# Patient Record
Sex: MALE | Race: BLACK OR AFRICAN AMERICAN | Employment: FULL TIME | URBAN - METROPOLITAN AREA
[De-identification: names, ages, dates, MRNs, and addresses within clinical notes are randomized per-mention and may not be internally consistent; named-entity substitution may affect disease eponyms.]

---

## 2017-01-05 ENCOUNTER — TELEPHONE (OUTPATIENT)
Dept: FAMILY MEDICINE CLINIC | Facility: CLINIC | Age: 52
End: 2017-01-05

## 2017-01-05 DIAGNOSIS — G89.29 CHRONIC HAND PAIN, LEFT: Primary | ICD-10-CM

## 2017-01-05 DIAGNOSIS — M79.642 CHRONIC HAND PAIN, LEFT: Primary | ICD-10-CM

## 2017-01-05 NOTE — TELEPHONE ENCOUNTER
Reviewed doctor's message with pt. Pt states he had to 6-7 days off last month. Pt is waiting for call back from [employer].     Reviewed referral information with pt, referral mailed to home address on file per pt request.

## 2017-01-05 NOTE — TELEPHONE ENCOUNTER
Pt would like to speak to RN about the [facility] paper that was received. Pt said that it stated that pt can be off for 4 days a moth due to the issues with pt hand. Pt would like to address that please.

## 2017-01-05 NOTE — TELEPHONE ENCOUNTER
Pt states he has not received a copy of [facility] paperwork that was faxed to his place of employment. Pt has a question about number of days that were approved, states only 4 days/month. Pt states he needs more time off than this, has problems when his hands swell. Reviewed 12/23/16 OV notes with pt, pt states he [facility]. Also advised pt to contact [facility] for [facility] information, pt agreed with advice given. Please advise if pt is to see ortho hand doctor.

## 2017-01-06 NOTE — TELEPHONE ENCOUNTER
Reviewed doctor's recommendations with pt. Pt states he is waiting for a call back from [facility]. Pt asked if new paperwork with this information would be faxed to his employer. Informed pt message will be forwarded to [facility] dept.

## 2017-03-07 ENCOUNTER — OFFICE VISIT (OUTPATIENT)
Dept: FAMILY MEDICINE CLINIC | Facility: CLINIC | Age: 52
End: 2017-03-07

## 2017-03-07 VITALS
DIASTOLIC BLOOD PRESSURE: 90 MMHG | HEART RATE: 70 BPM | SYSTOLIC BLOOD PRESSURE: 138 MMHG | WEIGHT: 257.19 LBS | BODY MASS INDEX: 33 KG/M2

## 2017-03-07 DIAGNOSIS — M65.9 TENOSYNOVITIS OF LEFT HAND: ICD-10-CM

## 2017-03-07 PROCEDURE — 99213 OFFICE O/P EST LOW 20 MIN: CPT | Performed by: FAMILY MEDICINE

## 2017-03-07 PROCEDURE — 99212 OFFICE O/P EST SF 10 MIN: CPT | Performed by: FAMILY MEDICINE

## 2017-03-07 NOTE — PROGRESS NOTES
HPI:    Neelima Roman is a 45year old male presents to clinic with concerns of left hand pain. This is a chronic issue for patient. He has not seen Ortho yet. States that he was pulling a box at work and pain flared up.  He has been taking Ibuprofen and Allergies:    Penicillins             Hives      ROS:   Review of Systems   Musculoskeletal:        + left hand pain         PHYSICAL EXAM:      03/07/17  0859   BP: 138/90   Pulse: 70   Weight: 257 lb 3.2 oz (116.665 kg)      Physical Exam   Constit

## 2017-05-12 ENCOUNTER — OFFICE VISIT (OUTPATIENT)
Dept: FAMILY MEDICINE CLINIC | Facility: CLINIC | Age: 52
End: 2017-05-12

## 2017-05-12 VITALS
BODY MASS INDEX: 33.37 KG/M2 | SYSTOLIC BLOOD PRESSURE: 120 MMHG | RESPIRATION RATE: 16 BRPM | HEIGHT: 74 IN | DIASTOLIC BLOOD PRESSURE: 80 MMHG | HEART RATE: 69 BPM | TEMPERATURE: 97 F | WEIGHT: 260 LBS

## 2017-05-12 DIAGNOSIS — I10 ESSENTIAL HYPERTENSION: ICD-10-CM

## 2017-05-12 DIAGNOSIS — L72.3 SEBACEOUS CYST: ICD-10-CM

## 2017-05-12 DIAGNOSIS — E11.9 TYPE 2 DIABETES MELLITUS WITHOUT COMPLICATION, WITHOUT LONG-TERM CURRENT USE OF INSULIN (HCC): Primary | ICD-10-CM

## 2017-05-12 PROCEDURE — 99212 OFFICE O/P EST SF 10 MIN: CPT | Performed by: FAMILY MEDICINE

## 2017-05-12 PROCEDURE — 99214 OFFICE O/P EST MOD 30 MIN: CPT | Performed by: FAMILY MEDICINE

## 2017-05-12 NOTE — PATIENT INSTRUCTIONS
Recommend weight loss via daily exercising and consistent healthy dietary changes. Monitor blood pressures and record at home. Limit salt intake. Medication reviewed and renewed where needed and appropriate. Comply with medications.

## 2017-05-12 NOTE — PROGRESS NOTES
HPI:    Patient ID: Robert Ruiz is a 45year old male. Hypertension  This is a chronic problem. The current episode started more than 1 year ago. The problem is controlled.  Pertinent negatives include no chest pain, headaches, palpitations, periphera MetFORMIN HCl 500 MG Oral Tab Take 1 tablet (500 mg total) by mouth 2 (two) times daily with meals. Disp: 60 tablet Rfl: 2   Atorvastatin Calcium 20 MG Oral Tab Take 1 tablet (20 mg total) by mouth nightly.  Disp: 30 tablet Rfl: 2   Enalapril Maleate (VASOT 2. Essential hypertension  To goal. Comply with medication and improve dietary intake. 3. Sebaceous cyst  Observe. If signs of infection which were discussed patient is to come back. 4.  BMI 33.0-33.9,adult  Recommend weight loss via daily exercising

## 2017-05-15 ENCOUNTER — TELEPHONE (OUTPATIENT)
Dept: FAMILY MEDICINE CLINIC | Facility: CLINIC | Age: 52
End: 2017-05-15

## 2017-05-16 NOTE — TELEPHONE ENCOUNTER
----- Message from Todd Razo DO sent at 5/13/2017  8:45 AM CDT -----  A1c shows poor control. Lipids have improved. Patient needs to get focused regarding this illness.  Starting to show increased protein in his urine and this is an indicator that

## 2017-05-16 NOTE — TELEPHONE ENCOUNTER
Spoke with patient (identified name and ), results reviewed and agrees with plan.   Called back and left contact info on patient's VM at his request, Dr Miguel Olsen or Dr Merlene Wiley, [phone] advised him to call this week and book appt, if no appts availabl

## 2017-05-24 ENCOUNTER — OFFICE VISIT (OUTPATIENT)
Dept: ENDOCRINOLOGY CLINIC | Facility: CLINIC | Age: 52
End: 2017-05-24

## 2017-05-24 VITALS
DIASTOLIC BLOOD PRESSURE: 78 MMHG | WEIGHT: 260 LBS | HEART RATE: 62 BPM | HEIGHT: 74 IN | SYSTOLIC BLOOD PRESSURE: 154 MMHG | BODY MASS INDEX: 33.37 KG/M2

## 2017-05-24 DIAGNOSIS — E11.65 TYPE 2 DIABETES MELLITUS WITH HYPERGLYCEMIA, WITHOUT LONG-TERM CURRENT USE OF INSULIN (HCC): ICD-10-CM

## 2017-05-24 DIAGNOSIS — E78.5 DYSLIPIDEMIA ASSOCIATED WITH TYPE 2 DIABETES MELLITUS (HCC): Primary | ICD-10-CM

## 2017-05-24 DIAGNOSIS — E11.69 DYSLIPIDEMIA ASSOCIATED WITH TYPE 2 DIABETES MELLITUS (HCC): Primary | ICD-10-CM

## 2017-05-24 PROCEDURE — 99212 OFFICE O/P EST SF 10 MIN: CPT | Performed by: INTERNAL MEDICINE

## 2017-05-24 PROCEDURE — 99244 OFF/OP CNSLTJ NEW/EST MOD 40: CPT | Performed by: INTERNAL MEDICINE

## 2017-05-24 RX ORDER — LANCETS 33 GAUGE
EACH MISCELLANEOUS
Qty: 100 EACH | Refills: 0 | Status: SHIPPED | OUTPATIENT
Start: 2017-05-24

## 2017-05-24 NOTE — H&P
New Patient Visit - Diabetes    CONSULT - Reason for Visit:  Diabetes management.     Requesting Physician: Buffy Appiah MD    CHIEF COMPLAINT:    DM   Dyslipidemia    HISTORY OF PRESENT ILLNESS:   Yari Recinos is a 45year old male who presents to hours as needed for Wheezing.  Disp: 2 Inhaler Rfl: 2       PAST MEDICAL HISTORY:   Past Medical History   Diagnosis Date   • Diabetes ([facility] [address]        PAST SURGICAL HISTORY:       Past Surgical History    ELECTROCARDIOGRAM, COMPLETE  10/8/2013    Comment kavita atraumatic  NECK: no adenopathy, thyroid symmetric, not enlarged and no tenderness  LUNGS: clear to auscultation bilaterally, no crackles or wheezing  CARDIOVASCULAR:  regular rate and rhythm, normal S1 and S2  ABDOMEN:  normal bowel sounds, soft, non-dist and glucometer on next visit. Patient says that he is very scared of needles and is hesitant to check. Discussed the importance of SMBG. He says he will try his best.   f). Life style changes: Diet: low carbohydrate diet discussed, Exercise: no  g).  Hypog

## 2017-05-24 NOTE — PATIENT INSTRUCTIONS
Check blood sugar 3 times per week    Start Farxiga 10 mg daily. Make sure to drink a lot of water to prevent fungal infections. Start Metformin 1/2 tablet with breakfast and 1/2 tablet with dinner for three days.  If you tolerate the medication well the

## 2017-10-23 RX ORDER — DAPAGLIFLOZIN 10 MG/1
10 TABLET, FILM COATED ORAL DAILY
Qty: 30 TABLET | Refills: 0 | OUTPATIENT
Start: 2017-10-23 | End: 2018-01-21

## 2017-10-23 NOTE — TELEPHONE ENCOUNTER
LOV 5/24/17 with RTC 1 month. No F/U scheduled. Called Luz Marina Zarate. LM that he is due for follow up. 1 month refills pending.

## 2017-11-14 ENCOUNTER — OFFICE VISIT (OUTPATIENT)
Dept: FAMILY MEDICINE CLINIC | Facility: CLINIC | Age: 52
End: 2017-11-14

## 2017-11-14 VITALS
TEMPERATURE: 97 F | BODY MASS INDEX: 32.1 KG/M2 | HEART RATE: 69 BPM | WEIGHT: 250.13 LBS | HEIGHT: 74 IN | DIASTOLIC BLOOD PRESSURE: 83 MMHG | SYSTOLIC BLOOD PRESSURE: 140 MMHG

## 2017-11-14 DIAGNOSIS — E11.9 TYPE 2 DIABETES MELLITUS WITHOUT COMPLICATION, WITHOUT LONG-TERM CURRENT USE OF INSULIN (HCC): Primary | ICD-10-CM

## 2017-11-14 DIAGNOSIS — Z13.29 THYROID DISORDER SCREEN: ICD-10-CM

## 2017-11-14 DIAGNOSIS — M79.642 LEFT HAND PAIN: ICD-10-CM

## 2017-11-14 DIAGNOSIS — I10 ESSENTIAL HYPERTENSION: ICD-10-CM

## 2017-11-14 DIAGNOSIS — Z12.5 PROSTATE CANCER SCREENING: ICD-10-CM

## 2017-11-14 PROCEDURE — 99212 OFFICE O/P EST SF 10 MIN: CPT | Performed by: FAMILY MEDICINE

## 2017-11-14 PROCEDURE — 99214 OFFICE O/P EST MOD 30 MIN: CPT | Performed by: FAMILY MEDICINE

## 2017-11-14 NOTE — PROGRESS NOTES
HPI:    Patient ID: Reinaldo Peabody is a 45year old male. 45year old AA male who continues to promise to get lab work for status update regarding his diabetes. He continues to evade getting his labs done and he does not measure his sugars at home. unhealthy diet. He never participates in exercise. An ACE inhibitor/angiotensin II receptor blocker is being taken. Review of Systems         Current Outpatient Prescriptions:  Glucose Blood (ONETOUCH VERIO) In Vitro Strip Check 1-2 times a day.  Disp Mouth/Throat: Oropharynx is clear and moist.   Neck: No thyromegaly present. Cardiovascular: Normal rate and regular rhythm. Exam reveals no gallop. Edema not present. Carotid bruit not present.   Pulmonary/Chest: Effort normal and breath sounds nor if symptoms worsen or fail to improve.          AT#[id number]

## 2017-11-14 NOTE — PATIENT INSTRUCTIONS
Pain management via prescription medications as needed. Monitor blood pressures and record at home. Limit salt intake. Recommend weight loss via daily exercising and consistent healthy dietary changes.   All adult screening ordered and done appropriate fo

## 2018-01-04 ENCOUNTER — TELEPHONE (OUTPATIENT)
Dept: FAMILY MEDICINE CLINIC | Facility: CLINIC | Age: 53
End: 2018-01-04

## 2018-01-04 NOTE — TELEPHONE ENCOUNTER
Pt dropped off FMLA at the [facility] fmla recertification please completed , pt have to have this completed no later than  January 18,2018  If any questions call pt at [phone] , pt signed HIPPA compliant release form , forms email to [email or web address] and copy placed on [place] desk at [facility]

## 2018-01-05 NOTE — TELEPHONE ENCOUNTER
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 Logged for processing.  NK

## 2018-01-10 NOTE — TELEPHONE ENCOUNTER
Dr. Dano Castro    Please sign off on form:  -Highlight the patient and hit \"Chart\" button. -In Chart Review, w/in the Encounter tab - open the Telephone call encounter for [id number]_____(DOS). Scroll down.  -Click \"scan on\" blue Hyperlink under \"Media\" heading for _PPD -FMLA _Dr. Dano Castro 7-.  -Click on Acknowledge button at the bottom right corner and left-click onto image, signature stamp appears and drag signature to Provider signature line. Stamp will turn blue. Close window.     Thank you,  Roel

## 2018-01-16 ENCOUNTER — NURSE TRIAGE (OUTPATIENT)
Dept: OTHER | Age: 53
End: 2018-01-16

## 2018-01-16 NOTE — TELEPHONE ENCOUNTER
Per pc w/ pt, form faxed to [employer], scanned, billed. Pt will  copy @[facility] and pay the bill. No further action needed.  NK

## 2018-01-16 NOTE — TELEPHONE ENCOUNTER
Action Requested: Summary for Provider     []  Critical Lab, Recommendations Needed  [] Need Additional Advice  []   FYI    []   Need Orders  [] Need Medications Sent to Pharmacy  []  Other     SUMMARY:  pls sign off.   Pt calling for a appt to see

## 2018-01-18 ENCOUNTER — OFFICE VISIT (OUTPATIENT)
Dept: FAMILY MEDICINE CLINIC | Facility: CLINIC | Age: 53
End: 2018-01-18

## 2018-01-18 VITALS
DIASTOLIC BLOOD PRESSURE: 80 MMHG | TEMPERATURE: 98 F | SYSTOLIC BLOOD PRESSURE: 140 MMHG | HEART RATE: 79 BPM | RESPIRATION RATE: 17 BRPM | HEIGHT: 74 IN

## 2018-01-18 DIAGNOSIS — R68.89 FLU-LIKE SYMPTOMS: ICD-10-CM

## 2018-01-18 DIAGNOSIS — B34.9 ACUTE VIRAL SYNDROME: Primary | ICD-10-CM

## 2018-01-18 DIAGNOSIS — G89.29 CHRONIC PAIN OF LEFT HAND: ICD-10-CM

## 2018-01-18 DIAGNOSIS — M79.642 CHRONIC PAIN OF LEFT HAND: ICD-10-CM

## 2018-01-18 PROCEDURE — 99214 OFFICE O/P EST MOD 30 MIN: CPT | Performed by: FAMILY MEDICINE

## 2018-01-18 PROCEDURE — 99212 OFFICE O/P EST SF 10 MIN: CPT | Performed by: FAMILY MEDICINE

## 2018-01-18 NOTE — PATIENT INSTRUCTIONS
Clear fluid hydration. Rest. Take all medications as prescribed. If symptoms persist or worsen please call or return to clinic ASAP. Pain management via prescription medications as needed. Comply with medications. FMLA adjusted.

## 2018-01-19 NOTE — PROGRESS NOTES
HPI:    Patient ID: Sherie Habermann is a 52year old male. Fever    This is a new problem. The current episode started in the past 7 days. The problem occurs intermittently. The problem has been gradually improving.  His temperature was unmeasured prior t 2   ibuprofen (ADVIL) 200 MG Oral Tab Take 200 mg by mouth every 6 (six) hours as needed for Pain. Disp:  Rfl:    Tadalafil (CIALIS) 20 MG Oral Tab Take 1 tablet (20 mg total) by mouth daily as needed for Erectile Dysfunction.  Disp: 8 tablet Rfl: 2   Enala pain of left hand  Pain management via prescription medications as needed. Keep specialist appointments  FMLA adjusted. No orders of the defined types were placed in this encounter.       Meds This Visit:  No prescriptions requested or ordered in this e

## 2018-03-01 ENCOUNTER — OFFICE VISIT (OUTPATIENT)
Dept: FAMILY MEDICINE CLINIC | Facility: CLINIC | Age: 53
End: 2018-03-01

## 2018-03-01 VITALS
TEMPERATURE: 98 F | HEART RATE: 86 BPM | OXYGEN SATURATION: 98 % | DIASTOLIC BLOOD PRESSURE: 70 MMHG | BODY MASS INDEX: 31 KG/M2 | WEIGHT: 242 LBS | RESPIRATION RATE: 16 BRPM | SYSTOLIC BLOOD PRESSURE: 109 MMHG

## 2018-03-01 DIAGNOSIS — R19.7 DIARRHEA, UNSPECIFIED TYPE: ICD-10-CM

## 2018-03-01 DIAGNOSIS — J18.9 PNEUMONIA OF LEFT LOWER LOBE DUE TO INFECTIOUS ORGANISM: Primary | ICD-10-CM

## 2018-03-01 PROCEDURE — 99212 OFFICE O/P EST SF 10 MIN: CPT | Performed by: FAMILY MEDICINE

## 2018-03-01 PROCEDURE — 99214 OFFICE O/P EST MOD 30 MIN: CPT | Performed by: FAMILY MEDICINE

## 2018-03-01 RX ORDER — AZITHROMYCIN 250 MG/1
TABLET, FILM COATED ORAL
Qty: 6 TABLET | Refills: 0 | Status: SHIPPED | OUTPATIENT
Start: 2018-03-01 | End: 2018-05-15 | Stop reason: ALTCHOICE

## 2018-03-01 RX ORDER — GLYBURIDE 2.5 MG/1
2.5 TABLET ORAL
Qty: 30 TABLET | Refills: 2 | Status: SHIPPED | OUTPATIENT
Start: 2018-03-01 | End: 2018-07-17

## 2018-03-01 NOTE — PROGRESS NOTES
HPI: Song Guzman is a 52year old male who presents for diarrhea and cough since Monday. Having about 6-7 episodes per day. Just watery. Denies nausea, abdominal cramping, vomiting. Has decreased appetite. Has chills. Denies body aches- only when he coughs. 86   Temp 98 °F (36.7 °C) (Oral)   Resp 16   Wt 242 lb (109.8 kg)   SpO2 98%   BMI 31.07 kg/m²    HEENT: Conjunctive clear. Preston ear canals clear. Preston TMs intact with good landmarks noted. Nares patent.   Oral mucous membrane moist.  Normal lips, teeth, a

## 2018-03-07 ENCOUNTER — TELEPHONE (OUTPATIENT)
Dept: FAMILY MEDICINE CLINIC | Facility: CLINIC | Age: 53
End: 2018-03-07

## 2018-03-07 NOTE — TELEPHONE ENCOUNTER
Pt calling to request a note to return back to work. Pt state that his job is requesting that note to state that he has been off work because he was diagnose with  pneumonia. .. please advise pt needs not by Friday to go back to work on Monday

## 2018-03-09 NOTE — TELEPHONE ENCOUNTER
Dr Rosi Potts, please see pending excuse note. Pt requested to add in his note that he had pneumonia. Pt will  the letter on Monday morning.

## 2018-03-12 ENCOUNTER — TELEPHONE (OUTPATIENT)
Dept: FAMILY MEDICINE CLINIC | Facility: CLINIC | Age: 53
End: 2018-03-12

## 2018-03-12 NOTE — TELEPHONE ENCOUNTER
Patient was seen by Dr. Casie Watts on 03/01/2018 and was Diagnosed with Pnuemonia and needs note for work stating had Pnuemonia and able to return to work today 03/12/2018. Please Fax note to [phone] attn [facility] and patient would like to pick a copy up.

## 2018-05-15 ENCOUNTER — OFFICE VISIT (OUTPATIENT)
Dept: FAMILY MEDICINE CLINIC | Facility: CLINIC | Age: 53
End: 2018-05-15

## 2018-05-15 VITALS
RESPIRATION RATE: 16 BRPM | WEIGHT: 255 LBS | SYSTOLIC BLOOD PRESSURE: 155 MMHG | DIASTOLIC BLOOD PRESSURE: 91 MMHG | TEMPERATURE: 98 F | HEART RATE: 64 BPM | BODY MASS INDEX: 32.73 KG/M2 | HEIGHT: 74 IN

## 2018-05-15 DIAGNOSIS — M79.642 LEFT HAND PAIN: Primary | ICD-10-CM

## 2018-05-15 DIAGNOSIS — IMO0001 UNCONTROLLED TYPE 2 DIABETES MELLITUS WITHOUT COMPLICATION, WITHOUT LONG-TERM CURRENT USE OF INSULIN: ICD-10-CM

## 2018-05-15 PROCEDURE — 99212 OFFICE O/P EST SF 10 MIN: CPT | Performed by: FAMILY MEDICINE

## 2018-05-15 PROCEDURE — 99214 OFFICE O/P EST MOD 30 MIN: CPT | Performed by: FAMILY MEDICINE

## 2018-05-15 RX ORDER — NAPROXEN 250 MG/1
250 TABLET ORAL 2 TIMES DAILY WITH MEALS
Qty: 60 TABLET | Refills: 1 | Status: SHIPPED | OUTPATIENT
Start: 2018-05-15 | End: 2019-03-14 | Stop reason: ALTCHOICE

## 2018-05-15 RX ORDER — GLYBURIDE 5 MG/1
5 TABLET ORAL
Qty: 30 TABLET | Refills: 2 | Status: SHIPPED | OUTPATIENT
Start: 2018-05-15 | End: 2019-02-28

## 2018-05-15 RX ORDER — GLYBURIDE 5 MG/1
5 TABLET ORAL
Qty: 30 TABLET | Refills: 2 | Status: SHIPPED | OUTPATIENT
Start: 2018-05-15 | End: 2018-05-15

## 2018-05-15 NOTE — PATIENT INSTRUCTIONS
Increase glyburide to 5 mg orally daily. Medication reviewed and renewed where needed and appropriate. Comply with medications. Recommend weight loss via daily exercising and consistent healthy dietary changes.   Monitor blood pressures and record at [facility]

## 2018-05-17 NOTE — PROGRESS NOTES
HPI:    Patient ID: Jimena Goodwin is a 52year old male. Hand Pain    The pain is present in the left hand. This is a chronic problem. The current episode started more than 1 year ago.  There has been no history of extremity trauma (But repetitive use f tablet (81 mg total) by mouth daily. Disp: 30 tablet Rfl: 11   Atorvastatin Calcium 20 MG Oral Tab Take 1 tablet (20 mg total) by mouth nightly. Disp: 30 tablet Rfl: 2   Enalapril Maleate (VASOTEC) 5 MG Oral Tab Take 1 tablet (5 mg total) by mouth daily.  D type 2 diabetes mellitus without complication, without long-term current use of insulin (HCC)  Prescription strength increased. - glyBURIDE 5 MG Oral Tab; Take 1 tablet (5 mg total) by mouth daily with breakfast.  Dispense: 30 tablet;  Refill: 2    No orde

## 2018-07-10 ENCOUNTER — NURSE TRIAGE (OUTPATIENT)
Dept: OTHER | Age: 53
End: 2018-07-10

## 2018-07-10 NOTE — TELEPHONE ENCOUNTER
Action Requested: Summary for Provider     []  Critical Lab, Recommendations Needed  [] Need Additional Advice  []   FYI    []   Need Orders  [] Need Medications Sent to Pharmacy  []  Other     SUMMARY: pt states he will go to [facility] as no appt availa

## 2018-07-17 ENCOUNTER — OFFICE VISIT (OUTPATIENT)
Dept: FAMILY MEDICINE CLINIC | Facility: CLINIC | Age: 53
End: 2018-07-17
Payer: COMMERCIAL

## 2018-07-17 ENCOUNTER — HOSPITAL ENCOUNTER (OUTPATIENT)
Dept: GENERAL RADIOLOGY | Age: 53
Discharge: HOME | End: 2018-07-17
Attending: FAMILY MEDICINE
Payer: COMMERCIAL

## 2018-07-17 VITALS
SYSTOLIC BLOOD PRESSURE: 161 MMHG | HEART RATE: 63 BPM | TEMPERATURE: 97 F | DIASTOLIC BLOOD PRESSURE: 78 MMHG | HEIGHT: 74 IN | RESPIRATION RATE: 14 BRPM

## 2018-07-17 DIAGNOSIS — M79.642 LEFT HAND PAIN: ICD-10-CM

## 2018-07-17 DIAGNOSIS — M79.642 LEFT HAND PAIN: Primary | ICD-10-CM

## 2018-07-17 DIAGNOSIS — I10 ESSENTIAL HYPERTENSION: ICD-10-CM

## 2018-07-17 PROCEDURE — 99214 OFFICE O/P EST MOD 30 MIN: CPT | Performed by: FAMILY MEDICINE

## 2018-07-17 PROCEDURE — 99212 OFFICE O/P EST SF 10 MIN: CPT | Performed by: FAMILY MEDICINE

## 2018-07-17 PROCEDURE — 73130 X-RAY EXAM OF HAND: CPT | Performed by: FAMILY MEDICINE

## 2018-07-17 NOTE — PROGRESS NOTES
HPI:    Tabatha Mccullough is a 52year old male presents to clinic for follow up. Notes that his left hand still hurts. He did re-injure it over Father's Day weekend. Has not had XR done or seen Ortho.  Notes that pain is worse in the AM, improves throughout tablet Rfl: 2   Sildenafil Citrate (VIAGRA) 100 MG Oral Tab Take  by mouth. Disp:  Rfl:    Albuterol Sulfate HFA (VENTOLIN) 108 (90 BASE) MCG/ACT Inhalation Aero Soln Inhale 2 puffs into the lungs every 6 (six) hours as needed for Wheezing.  Disp: 2 Inhaler encounter.       Meds This Visit:    No prescriptions requested or ordered in this encounter    Imaging & Referrals:  None       7/17/2018  Warden Edward MD

## 2018-07-26 ENCOUNTER — TELEPHONE (OUTPATIENT)
Dept: FAMILY MEDICINE CLINIC | Facility: CLINIC | Age: 53
End: 2018-07-26

## 2018-07-26 NOTE — TELEPHONE ENCOUNTER
----- Message from Mone Perez DO sent at 7/18/2018 11:56 AM CDT -----  No fracture seen on xray. Degenerative arthtritis noted.

## 2018-12-12 NOTE — TELEPHONE ENCOUNTER
Pt is calling to request a refill on the medications listed below. Pt states he hasn't gotten them in a while now but he would like for a prescription to be sent to pharmacy. Pt is scheduled for an appt with  1/22/19      Please advise thank you.

## 2018-12-13 RX ORDER — SILDENAFIL 100 MG/1
100 TABLET, FILM COATED ORAL
Qty: 8 TABLET | Refills: 0 | Status: SHIPPED | OUTPATIENT
Start: 2018-12-13 | End: 2020-07-30

## 2018-12-13 RX ORDER — TADALAFIL 20 MG/1
20 TABLET ORAL
Qty: 8 TABLET | Refills: 2 | Status: SHIPPED | OUTPATIENT
Start: 2018-12-13

## 2018-12-13 NOTE — TELEPHONE ENCOUNTER
Please advise in regards to refill request. Thank You    Refill Protocol Appointment Criteria  · Appointment scheduled in the past 12 months or in the next 3 months  Recent Outpatient Visits            4 months [facility]

## 2019-01-23 ENCOUNTER — TELEPHONE (OUTPATIENT)
Dept: FAMILY MEDICINE CLINIC | Facility: CLINIC | Age: 54
End: 2019-01-23

## 2019-01-23 DIAGNOSIS — E11.9 TYPE 2 DIABETES MELLITUS WITHOUT COMPLICATION, WITHOUT LONG-TERM CURRENT USE OF INSULIN (HCC): Primary | ICD-10-CM

## 2019-01-23 DIAGNOSIS — Z00.00 PHYSICAL EXAM, ROUTINE: ICD-10-CM

## 2019-01-23 DIAGNOSIS — Z13.29 THYROID DISORDER SCREEN: ICD-10-CM

## 2019-01-23 DIAGNOSIS — Z12.5 PROSTATE CANCER SCREENING: ICD-10-CM

## 2019-01-23 NOTE — TELEPHONE ENCOUNTER
Pt dropped off [facility] paperwork to be completed. Would like it faxed and a copy to .  Pt paid

## 2019-01-23 NOTE — TELEPHONE ENCOUNTER
Pt is requesting orders for his blood work , please have Dr Mandy Almeida to order them  The previous orders have   2018  Then call the pt he would like to come in before his appt in 2019 to see Dr Mandy Almeida

## 2019-01-23 NOTE — TELEPHONE ENCOUNTER
Order on chart. Please call patient. Cannot accommodate appointment request-patient missed his appointment on 01/22/19.

## 2019-01-24 NOTE — TELEPHONE ENCOUNTER
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 Logged for processing.  NK

## 2019-01-28 NOTE — TELEPHONE ENCOUNTER
Left pt a voicemail informing of orders on chart. [facility] will not accommodate appointment request. Pt missed last appointment and has cancelled or missed previous one. No further action needed at this time.

## 2019-02-01 NOTE — TELEPHONE ENCOUNTER
Dr. Harshad Cantu,       Please sign off on form:  -Highlight the patient and hit \"Chart\" button. -In Chart Review, w/in the Encounter tab - click 1 time on the Telephone call encounter [id number] _. Scroll down the telephone encounter.  -Click \"scan on\" blue Hyperlink under \"Media\" heading for _Dr. Monalisa Herbert recert 9-1-73  w/in the telephone enc.  -Click on Acknowledge button at the bottom right corner and left-click onto image, signature stamp appears and drag signature to Provider signature line. Stamp will turn blue. Close window.     Thank you,  Valeriano Prabhakar

## 2019-02-05 NOTE — TELEPHONE ENCOUNTER
Form completed and faxed to [facility]. Copies will be mailed to audrey BRAUN

## 2019-02-28 ENCOUNTER — OFFICE VISIT (OUTPATIENT)
Dept: FAMILY MEDICINE CLINIC | Facility: CLINIC | Age: 54
End: 2019-02-28
Payer: COMMERCIAL

## 2019-02-28 VITALS
TEMPERATURE: 98 F | SYSTOLIC BLOOD PRESSURE: 140 MMHG | WEIGHT: 264 LBS | BODY MASS INDEX: 33.88 KG/M2 | RESPIRATION RATE: 17 BRPM | HEIGHT: 74 IN | HEART RATE: 73 BPM | DIASTOLIC BLOOD PRESSURE: 80 MMHG

## 2019-02-28 DIAGNOSIS — E78.5 HYPERLIPIDEMIA, UNSPECIFIED HYPERLIPIDEMIA TYPE: Primary | ICD-10-CM

## 2019-02-28 DIAGNOSIS — IMO0001 UNCONTROLLED TYPE 2 DIABETES MELLITUS WITHOUT COMPLICATION, WITHOUT LONG-TERM CURRENT USE OF INSULIN: ICD-10-CM

## 2019-02-28 DIAGNOSIS — M79.642 LEFT HAND PAIN: ICD-10-CM

## 2019-02-28 DIAGNOSIS — I10 ESSENTIAL HYPERTENSION: ICD-10-CM

## 2019-02-28 PROCEDURE — 99212 OFFICE O/P EST SF 10 MIN: CPT | Performed by: FAMILY MEDICINE

## 2019-02-28 PROCEDURE — 99214 OFFICE O/P EST MOD 30 MIN: CPT | Performed by: FAMILY MEDICINE

## 2019-02-28 RX ORDER — ENALAPRIL MALEATE 5 MG/1
5 TABLET ORAL DAILY
Qty: 30 TABLET | Refills: 2 | Status: SHIPPED | OUTPATIENT
Start: 2019-02-28 | End: 2019-11-22

## 2019-02-28 RX ORDER — IBUPROFEN 800 MG/1
TABLET ORAL
Refills: 0 | COMMUNITY
Start: 2018-09-24 | End: 2020-01-13

## 2019-02-28 RX ORDER — GLYBURIDE 5 MG/1
5 TABLET ORAL
Qty: 30 TABLET | Refills: 2 | Status: SHIPPED | OUTPATIENT
Start: 2019-02-28 | End: 2019-04-24

## 2019-02-28 RX ORDER — ATORVASTATIN CALCIUM 20 MG/1
20 TABLET, FILM COATED ORAL NIGHTLY
Qty: 30 TABLET | Refills: 2 | Status: SHIPPED | OUTPATIENT
Start: 2019-02-28 | End: 2019-04-24

## 2019-02-28 NOTE — PROGRESS NOTES
HPI:    Patient ID: Jamison Amaya is a 62year old male. 66-year-old -American male with continued pain involving the left hand. This is a chronic problem. The current episode started more than 1 year ago.  There has been no history of extremity Sildenafil Citrate (VIAGRA) 100 MG Oral Tab Take 1 tablet (100 mg total) by mouth daily as needed for Erectile Dysfunction.  Disp: 8 tablet Rfl: 0   Tadalafil (CIALIS) 20 MG Oral Tab Take 1 tablet (20 mg total) by mouth daily as needed for Erectile Dysfunct - metFORMIN HCl 1000 MG Oral Tab; Take 1 tablet (1,000 mg total) by mouth 2 (two) times daily with meals. Dispense: 60 tablet; Refill: 2  - glyBURIDE 5 MG Oral Tab; Take 1 tablet (5 mg total) by mouth daily with breakfast.  Dispense: 30 tablet;  Refill: 2 Patient to return next week in a fasting state for his updated labs. Return in about 3 months (around 5/28/2019), or if symptoms worsen or fail to improve.          #[id number]

## 2019-02-28 NOTE — PATIENT INSTRUCTIONS
Medication reviewed and renewed where needed and appropriate. Comply with medications. Monitor blood pressures and record at home. Limit salt intake. Recommend weight loss via daily exercising and consistent healthy dietary changes.   Compliance has been

## 2019-03-01 LAB
ALBUMIN/GLOBULIN RATIO: 1.2 (CALC) (ref 1–2.5)
ALBUMIN: 3.8 G/DL (ref 3.6–5.1)
ALKALINE PHOSPHATASE: 90 U/L (ref 40–115)
ALT: 16 U/L (ref 9–46)
AST: 12 U/L (ref 10–35)
BILIRUBIN, TOTAL: 0.3 MG/DL (ref 0.2–1.2)
BUN: 12 MG/DL (ref 7–25)
CALCIUM: 9.3 MG/DL (ref 8.6–10.3)
CARBON DIOXIDE: 30 MMOL/L (ref 20–32)
CHLORIDE: 99 MMOL/L (ref 98–110)
CHOL/HDLC RATIO: 4.9 (CALC)
CHOLESTEROL, TOTAL: 236 MG/DL
CREATININE: 0.93 MG/DL (ref 0.7–1.33)
EGFR IF AFRICN AM: 106 ML/MIN/1.73M2
EGFR IF NONAFRICN AM: 91 ML/MIN/1.73M2
GLOBULIN: 3.1 G/DL (CALC) (ref 1.9–3.7)
GLUCOSE: 281 MG/DL (ref 65–99)
HDL CHOLESTEROL: 48 MG/DL
HEMOGLOBIN A1C: 11.9 % OF TOTAL HGB
LDL-CHOLESTEROL: 157 MG/DL (CALC)
NON-HDL CHOLESTEROL: 188 MG/DL (CALC)
POTASSIUM: 4.5 MMOL/L (ref 3.5–5.3)
PROTEIN, TOTAL, RANDOM UR: 55 MG/DL (ref 5–25)
PROTEIN, TOTAL: 6.9 G/DL (ref 6.1–8.1)
SODIUM: 138 MMOL/L (ref 135–146)
TRIGLYCERIDES: 174 MG/DL

## 2019-03-06 ENCOUNTER — TELEPHONE (OUTPATIENT)
Dept: FAMILY MEDICINE CLINIC | Facility: CLINIC | Age: 54
End: 2019-03-06

## 2019-03-06 NOTE — TELEPHONE ENCOUNTER
----- Message from Everardo Ledesma DO sent at 3/4/2019  1:40 PM CST -----  Patient's blood sugars poorly controlled. Staff the patient to get serious about his condition and the potential damage he can do to his organs.   I am recommending a referral t

## 2019-03-11 NOTE — TELEPHONE ENCOUNTER
Advised patient of Dr. Parrish Severe note.  Patient verbalized understanding  Called patient back and left voicemail with Dr. Ebony Bynum information per request.

## 2019-03-14 ENCOUNTER — OFFICE VISIT (OUTPATIENT)
Dept: FAMILY MEDICINE CLINIC | Facility: CLINIC | Age: 54
End: 2019-03-14
Payer: COMMERCIAL

## 2019-03-14 VITALS
WEIGHT: 254.38 LBS | DIASTOLIC BLOOD PRESSURE: 83 MMHG | HEART RATE: 99 BPM | SYSTOLIC BLOOD PRESSURE: 156 MMHG | BODY MASS INDEX: 33 KG/M2 | TEMPERATURE: 98 F | RESPIRATION RATE: 18 BRPM | OXYGEN SATURATION: 93 %

## 2019-03-14 DIAGNOSIS — R05.9 COUGH: Primary | ICD-10-CM

## 2019-03-14 PROCEDURE — 99213 OFFICE O/P EST LOW 20 MIN: CPT | Performed by: FAMILY MEDICINE

## 2019-03-14 PROCEDURE — 99212 OFFICE O/P EST SF 10 MIN: CPT | Performed by: FAMILY MEDICINE

## 2019-03-14 RX ORDER — AZITHROMYCIN 250 MG/1
TABLET, FILM COATED ORAL
Qty: 6 TABLET | Refills: 0 | Status: SHIPPED | OUTPATIENT
Start: 2019-03-14 | End: 2019-05-03 | Stop reason: ALTCHOICE

## 2019-03-14 NOTE — PROGRESS NOTES
HPI:    Patient ID: Torres Nicholson is a 62year old male. Cough   This is a new problem. The current episode started 1 to 4 weeks ago. The problem has been waxing and waning. The cough is productive of sputum.  Pertinent negatives include no chest pain, (VENTOLIN) 108 (90 BASE) MCG/ACT Inhalation Aero Soln Inhale 2 puffs into the lungs every 6 (six) hours as needed for Wheezing.  Disp: 2 Inhaler Rfl: 2     Allergies:  Penicillins             HIVES   PHYSICAL EXAM:   Physical Exam   Constitutional: He is or

## 2019-04-24 ENCOUNTER — OFFICE VISIT (OUTPATIENT)
Dept: ENDOCRINOLOGY CLINIC | Facility: CLINIC | Age: 54
End: 2019-04-24
Payer: COMMERCIAL

## 2019-04-24 VITALS
SYSTOLIC BLOOD PRESSURE: 178 MMHG | BODY MASS INDEX: 34.31 KG/M2 | HEIGHT: 74 IN | WEIGHT: 267.38 LBS | DIASTOLIC BLOOD PRESSURE: 87 MMHG | HEART RATE: 64 BPM

## 2019-04-24 DIAGNOSIS — E11.65 TYPE 2 DIABETES MELLITUS WITH HYPERGLYCEMIA, WITHOUT LONG-TERM CURRENT USE OF INSULIN (HCC): Primary | ICD-10-CM

## 2019-04-24 DIAGNOSIS — E78.5 HYPERLIPIDEMIA, UNSPECIFIED HYPERLIPIDEMIA TYPE: ICD-10-CM

## 2019-04-24 PROCEDURE — 82962 GLUCOSE BLOOD TEST: CPT | Performed by: INTERNAL MEDICINE

## 2019-04-24 PROCEDURE — 99212 OFFICE O/P EST SF 10 MIN: CPT | Performed by: INTERNAL MEDICINE

## 2019-04-24 PROCEDURE — 36416 COLLJ CAPILLARY BLOOD SPEC: CPT | Performed by: INTERNAL MEDICINE

## 2019-04-24 PROCEDURE — 99244 OFF/OP CNSLTJ NEW/EST MOD 40: CPT | Performed by: INTERNAL MEDICINE

## 2019-04-24 RX ORDER — ATORVASTATIN CALCIUM 20 MG/1
20 TABLET, FILM COATED ORAL NIGHTLY
Qty: 90 TABLET | Refills: 0 | Status: SHIPPED | OUTPATIENT
Start: 2019-04-24 | End: 2019-11-22

## 2019-04-24 RX ORDER — FLASH GLUCOSE SENSOR
1 KIT MISCELLANEOUS
Qty: 6 EACH | Refills: 0 | Status: SHIPPED | OUTPATIENT
Start: 2019-04-24

## 2019-04-24 RX ORDER — GLIPIZIDE 10 MG/1
10 TABLET ORAL
Qty: 180 TABLET | Refills: 0 | Status: SHIPPED | OUTPATIENT
Start: 2019-04-24 | End: 2019-11-22

## 2019-04-24 RX ORDER — HYDROCHLOROTHIAZIDE 25 MG/1
25 TABLET ORAL DAILY
Qty: 90 TABLET | Refills: 0 | Status: SHIPPED | OUTPATIENT
Start: 2019-04-24 | End: 2019-11-22

## 2019-04-24 RX ORDER — FLASH GLUCOSE SCANNING READER
1 EACH MISCELLANEOUS ONCE
Qty: 1 DEVICE | Refills: 0 | Status: SHIPPED | OUTPATIENT
Start: 2019-04-24 | End: 2019-04-24

## 2019-04-24 NOTE — H&P
New Patient Visit - Diabetes    CONSULT - Reason for Visit:  Diabetes management.     Requesting Physician: Dr. Apolinar Adams:  Patient presents with:  Consult: DM consult       HISTORY OF PRESENT ILLNESS:   Aleksandr Colón is a 62year old mal Tab TAKE 1 TABLET BY MOUTH EVERY 8 HOURS AS NEEDED FOR PAIN Disp:  Rfl: 0   metFORMIN HCl 1000 MG Oral Tab Take 1 tablet (1,000 mg total) by mouth 2 (two) times daily with meals.  Disp: 60 tablet Rfl: 2   Sildenafil Citrate (VIAGRA) 100 MG Oral Tab Take 1 t use: No    Other Topics      Concerns:        Caffeine Concern: No      FAMILY HISTORY:   Family History   Problem Relation Age of Onset   • Diabetes Mother        ASSESSMENTS:        REVIEW OF SYSTEMS:  Constitutional: Negative for: weight change, fever, diabetes. Patient understands the importance of glycemic control and the implications of uncontrolled diabetes including Diabetic ketoacidosis and various micro vascular and macrovascular complications. a).  Medications:  Metformin: Increase to 1000 mg

## 2019-04-24 NOTE — PATIENT INSTRUCTIONS
Metformin 1000 with breakfast and 1000 mg with dinner  Glyburide: STOP.  I am changing this to Glipizide 10 mg daily: take one pill with breakfast    I am prescribing the freestyle carla    Check sugars before breakfast and before dinner  Call in one week w

## 2019-05-03 ENCOUNTER — OFFICE VISIT (OUTPATIENT)
Dept: FAMILY MEDICINE CLINIC | Facility: CLINIC | Age: 54
End: 2019-05-03
Payer: COMMERCIAL

## 2019-05-03 VITALS
SYSTOLIC BLOOD PRESSURE: 130 MMHG | BODY MASS INDEX: 34 KG/M2 | WEIGHT: 264.19 LBS | DIASTOLIC BLOOD PRESSURE: 80 MMHG | RESPIRATION RATE: 18 BRPM | HEART RATE: 74 BPM | TEMPERATURE: 98 F

## 2019-05-03 DIAGNOSIS — E11.9 TYPE 2 DIABETES MELLITUS WITHOUT COMPLICATION, WITHOUT LONG-TERM CURRENT USE OF INSULIN (HCC): Primary | ICD-10-CM

## 2019-05-03 DIAGNOSIS — E78.5 HYPERLIPIDEMIA, UNSPECIFIED HYPERLIPIDEMIA TYPE: ICD-10-CM

## 2019-05-03 DIAGNOSIS — M79.642 LEFT HAND PAIN: ICD-10-CM

## 2019-05-03 DIAGNOSIS — I10 ESSENTIAL HYPERTENSION: ICD-10-CM

## 2019-05-03 PROCEDURE — 99214 OFFICE O/P EST MOD 30 MIN: CPT | Performed by: FAMILY MEDICINE

## 2019-05-03 PROCEDURE — 99212 OFFICE O/P EST SF 10 MIN: CPT | Performed by: FAMILY MEDICINE

## 2019-05-03 NOTE — PATIENT INSTRUCTIONS
I discussed with patient the possibility of switching 1 of his blood sugar medications to Januvia or Jardiance. We will discuss with endocrinologist for clearance. We will also try to see what cholesterol-lowering agent is covered under his insurance.   D

## 2019-05-03 NOTE — PROGRESS NOTES
HPI:    Patient ID: Celia Escobedo is a 62year old male. 57-year-old -American male with continued pain involving the left hand. This is a chronic problem. The current episode started more than 1 year ago.  There has been no history of extremity Albuterol Sulfate HFA (VENTOLIN) 108 (90 BASE) MCG/ACT Inhalation Aero Soln Inhale 2 puffs into the lungs every 6 (six) hours as needed for Wheezing.  Disp: 2 Inhaler Rfl: 2   Continuous Blood Gluc Sensor ([employer] JULIETTE 14 DAY SENSOR) Does not apply Misc None  Patient Instructions   I discussed with patient the possibility of switching 1 of his blood sugar medications to Januvia or Jardiance. We will discuss with endocrinologist for clearance.   We will also try to see what cholesterol-lowering agent is co

## 2019-06-18 ENCOUNTER — NURSE TRIAGE (OUTPATIENT)
Dept: OTHER | Age: 54
End: 2019-06-18

## 2019-06-18 ENCOUNTER — OFFICE VISIT (OUTPATIENT)
Dept: FAMILY MEDICINE CLINIC | Facility: CLINIC | Age: 54
End: 2019-06-18
Payer: COMMERCIAL

## 2019-06-18 VITALS
SYSTOLIC BLOOD PRESSURE: 108 MMHG | DIASTOLIC BLOOD PRESSURE: 66 MMHG | HEART RATE: 87 BPM | BODY MASS INDEX: 32.85 KG/M2 | TEMPERATURE: 99 F | HEIGHT: 74 IN | WEIGHT: 256 LBS

## 2019-06-18 DIAGNOSIS — A08.4 VIRAL GASTROENTERITIS: Primary | ICD-10-CM

## 2019-06-18 DIAGNOSIS — E11.9 TYPE 2 DIABETES MELLITUS WITHOUT COMPLICATION, WITHOUT LONG-TERM CURRENT USE OF INSULIN (HCC): ICD-10-CM

## 2019-06-18 PROCEDURE — 82962 GLUCOSE BLOOD TEST: CPT | Performed by: FAMILY MEDICINE

## 2019-06-18 PROCEDURE — 36416 COLLJ CAPILLARY BLOOD SPEC: CPT | Performed by: FAMILY MEDICINE

## 2019-06-18 PROCEDURE — 99212 OFFICE O/P EST SF 10 MIN: CPT | Performed by: FAMILY MEDICINE

## 2019-06-18 PROCEDURE — 99214 OFFICE O/P EST MOD 30 MIN: CPT | Performed by: FAMILY MEDICINE

## 2019-06-18 NOTE — TELEPHONE ENCOUNTER
The patient can be seen in [facility], however I do not have any availability so you can check with another provider here.

## 2019-06-18 NOTE — TELEPHONE ENCOUNTER
Action Requested: Summary for Provider     []  Critical Lab, Recommendations Needed  [] Need Additional Advice  []   FYI    []   Need Orders  [] Need Medications Sent to Pharmacy  []  Other     SUMMARY:    Dr. Jinny Guzman please advise.   He only wants to be s

## 2019-06-18 NOTE — PROGRESS NOTES
HPI:    Elpidio Roberts is a 62year old male presents to clinic with a 4-day history of loose stools. Saturday night, patient drinks some alcohol and ate ribs that were prepared at a restaurant. Later that night, he woke up with loose stools.   Since th daily. Disp: 30 tablet Rfl: 2   Sildenafil Citrate (VIAGRA) 100 MG Oral Tab Take 1 tablet (100 mg total) by mouth daily as needed for Erectile Dysfunction.  Disp: 8 tablet Rfl: 0   Tadalafil (CIALIS) 20 MG Oral Tab Take 1 tablet (20 mg total) by mouth daily alert.   Vitals reviewed.       ASSESSMENT/PLAN:   (A08.4) Viral gastroenteritis  (primary encounter diagnosis)  Plan:   - advised adequate hydration with water and a sports drink with electrolytes  - bland diet advised - toast, applesauce, soup, rice, bana

## 2019-06-18 NOTE — TELEPHONE ENCOUNTER
Spoke with patient and advised that PCP is fully booked today and cannot be seen but advised to be seen by other provider and agrees, OV made with Dr Sethi Getting for today 6/18/19.

## 2019-06-20 ENCOUNTER — TELEPHONE (OUTPATIENT)
Dept: FAMILY MEDICINE CLINIC | Facility: CLINIC | Age: 54
End: 2019-06-20

## 2019-06-20 NOTE — TELEPHONE ENCOUNTER
Pt is requesting a return to work note for the following days : 06/10-06/24 since he doesn't work Friday's. Requesting to  note.

## 2019-06-22 NOTE — TELEPHONE ENCOUNTER
Left pt a vm that letter is on chart as he has Mychart. Also informed can picked up printed letter from [facility] office.

## 2019-06-24 NOTE — TELEPHONE ENCOUNTER
Called pt left msg needs to call back. Need clarification on dates since we are getting two different thing first msg stated he need from 6/10 to return 6/24 now he wants 6/17 to rt 6/24 please clarify. Thank you.

## 2019-06-24 NOTE — TELEPHONE ENCOUNTER
Pt called back to give the fax # to have letter fax so he can go back to work today.   Pt also said the letter he picked up from office today 5 minutes ago the date is wrong    Pt said the date on the letter should be from   6/17 and he can return back to w

## 2019-11-22 ENCOUNTER — OFFICE VISIT (OUTPATIENT)
Dept: FAMILY MEDICINE CLINIC | Facility: CLINIC | Age: 54
End: 2019-11-22
Payer: COMMERCIAL

## 2019-11-22 VITALS
HEIGHT: 74 IN | RESPIRATION RATE: 17 BRPM | BODY MASS INDEX: 33.11 KG/M2 | HEART RATE: 54 BPM | DIASTOLIC BLOOD PRESSURE: 84 MMHG | SYSTOLIC BLOOD PRESSURE: 126 MMHG | WEIGHT: 258 LBS | TEMPERATURE: 98 F

## 2019-11-22 DIAGNOSIS — Z13.29 THYROID DISORDER SCREEN: ICD-10-CM

## 2019-11-22 DIAGNOSIS — M79.642 CHRONIC PAIN OF LEFT HAND: Primary | ICD-10-CM

## 2019-11-22 DIAGNOSIS — E11.9 TYPE 2 DIABETES MELLITUS WITHOUT COMPLICATION, WITHOUT LONG-TERM CURRENT USE OF INSULIN (HCC): ICD-10-CM

## 2019-11-22 DIAGNOSIS — G89.29 CHRONIC PAIN OF LEFT HAND: Primary | ICD-10-CM

## 2019-11-22 DIAGNOSIS — E78.5 HYPERLIPIDEMIA, UNSPECIFIED HYPERLIPIDEMIA TYPE: ICD-10-CM

## 2019-11-22 DIAGNOSIS — I10 ESSENTIAL HYPERTENSION: ICD-10-CM

## 2019-11-22 DIAGNOSIS — E78.5 DYSLIPIDEMIA: ICD-10-CM

## 2019-11-22 DIAGNOSIS — Z12.5 PROSTATE CANCER SCREENING: ICD-10-CM

## 2019-11-22 PROCEDURE — 99214 OFFICE O/P EST MOD 30 MIN: CPT | Performed by: FAMILY MEDICINE

## 2019-11-22 RX ORDER — GLIPIZIDE 10 MG/1
10 TABLET ORAL
Qty: 60 TABLET | Refills: 2 | Status: SHIPPED | OUTPATIENT
Start: 2019-11-22 | End: 2020-02-27

## 2019-11-22 RX ORDER — METFORMIN HYDROCHLORIDE 1000 MG/1
1000 TABLET, FILM COATED, EXTENDED RELEASE ORAL
Qty: 30 TABLET | Refills: 2 | Status: SHIPPED | OUTPATIENT
Start: 2019-11-22 | End: 2020-02-27

## 2019-11-22 RX ORDER — ENALAPRIL MALEATE 5 MG/1
5 TABLET ORAL DAILY
Qty: 30 TABLET | Refills: 2 | Status: SHIPPED | OUTPATIENT
Start: 2019-11-22 | End: 2020-02-27

## 2019-11-22 RX ORDER — HYDROCHLOROTHIAZIDE 25 MG/1
25 TABLET ORAL DAILY
Qty: 30 TABLET | Refills: 2 | Status: SHIPPED | OUTPATIENT
Start: 2019-11-22 | End: 2020-02-27

## 2019-11-22 RX ORDER — ATORVASTATIN CALCIUM 20 MG/1
20 TABLET, FILM COATED ORAL NIGHTLY
Qty: 30 TABLET | Refills: 2 | Status: SHIPPED | OUTPATIENT
Start: 2019-11-22 | End: 2021-02-25

## 2019-11-25 NOTE — PROGRESS NOTES
HPI:    Patient ID: Magdalena Childers is a 62year old male.     Patient is a 47-year-old -American male well established at this clinic treated for hypertension and diabetes who is unfortunately noncompliant regarding measuring his blood sugars (has a (CIALIS) 20 MG Oral Tab Take 1 tablet (20 mg total) by mouth daily as needed for Erectile Dysfunction. 8 tablet 2   • Glucose Blood (ONETOUCH VERIO) In Vitro Strip Check 1-2 times a day.  50 each 0   • ONETOUCH DELICA LANCETS 56L Does not apply Misc Check o metFORMIN HCl ER, MOD, 1000 MG (MOD) Oral Tablet 24 Hr; Take 1 tablet (1,000 mg total) by mouth daily with breakfast.  Dispense: 30 tablet; Refill: 2    3. Dyslipidemia  Status update    4.  Essential hypertension  When measured manually by physician blood and renewed where needed and appropriate. Monitor blood pressures and record at home. Limit salt intake. Recommend weight loss via daily exercising and consistent healthy dietary changes. Encouraged physical fitness and daily physical activity daily.   P

## 2020-01-13 NOTE — TELEPHONE ENCOUNTER
Patient requesting refill, pharmacy advised him to call us.  Its been a year since he has had this medication    ibuprofen 800 MG Oral Tab

## 2020-01-14 RX ORDER — IBUPROFEN 800 MG/1
TABLET ORAL
Qty: 90 TABLET | Refills: 0 | Status: SHIPPED | OUTPATIENT
Start: 2020-01-14 | End: 2020-02-07

## 2020-01-14 NOTE — TELEPHONE ENCOUNTER
Rx listed as historical, pls advise, thanks in advance.        Refill Protocol Appointment Criteria  · Appointment scheduled in the past 12 months or in the next 3 months  Recent Outpatient Visits            1 month ago Chronic pain of left hand    [place]

## 2020-02-07 RX ORDER — IBUPROFEN 800 MG/1
TABLET ORAL
Qty: 90 TABLET | Refills: 0 | Status: SHIPPED | OUTPATIENT
Start: 2020-02-07 | End: 2020-12-23

## 2020-02-07 NOTE — TELEPHONE ENCOUNTER
Review pended refill request as it does not fall under a protocol.     Last Rx: 1/14/20  LOV: 2 months ago

## 2020-02-21 ENCOUNTER — LAB ENCOUNTER (OUTPATIENT)
Dept: LAB | Age: 55
End: 2020-02-21
Attending: FAMILY MEDICINE
Payer: COMMERCIAL

## 2020-02-21 DIAGNOSIS — Z13.29 SCREENING FOR THYROID DISORDER: ICD-10-CM

## 2020-02-21 DIAGNOSIS — I10 ESSENTIAL HYPERTENSION, MALIGNANT: ICD-10-CM

## 2020-02-21 DIAGNOSIS — E11.9 DIABETES MELLITUS (HCC): Primary | ICD-10-CM

## 2020-02-21 PROCEDURE — 36415 COLL VENOUS BLD VENIPUNCTURE: CPT

## 2020-02-22 LAB
ABSOLUTE BASOPHILS: 50 CELLS/UL (ref 0–200)
ABSOLUTE EOSINOPHILS: 256 CELLS/UL (ref 15–500)
ABSOLUTE LYMPHOCYTES: 2691 CELLS/UL (ref 850–3900)
ABSOLUTE MONOCYTES: 774 CELLS/UL (ref 200–950)
ABSOLUTE NEUTROPHILS: 3330 CELLS/UL (ref 1500–7800)
ALBUMIN/GLOBULIN RATIO: 1.5 (CALC) (ref 1–2.5)
ALBUMIN: 4.1 G/DL (ref 3.6–5.1)
ALKALINE PHOSPHATASE: 68 U/L (ref 35–144)
ALT: 14 U/L (ref 9–46)
AST: 14 U/L (ref 10–35)
BASOPHILS: 0.7 %
BILIRUBIN, TOTAL: 0.6 MG/DL (ref 0.2–1.2)
BUN: 14 MG/DL (ref 7–25)
CALCIUM: 9.6 MG/DL (ref 8.6–10.3)
CARBON DIOXIDE: 29 MMOL/L (ref 20–32)
CHLORIDE: 106 MMOL/L (ref 98–110)
CHOL/HDLC RATIO: 4.9 (CALC)
CHOLESTEROL, TOTAL: 257 MG/DL
CREATININE: 1.13 MG/DL (ref 0.7–1.33)
EGFR IF AFRICN AM: 83 ML/MIN/1.73M2
EGFR IF NONAFRICN AM: 72 ML/MIN/1.73M2
EOSINOPHILS: 3.6 %
GLOBULIN: 2.8 G/DL (CALC) (ref 1.9–3.7)
GLUCOSE: 149 MG/DL (ref 65–99)
HDL CHOLESTEROL: 52 MG/DL
HEMATOCRIT: 40.2 % (ref 38.5–50)
HEMOGLOBIN A1C: 8.7 % OF TOTAL HGB
HEMOGLOBIN: 14.1 G/DL (ref 13.2–17.1)
LDL-CHOLESTEROL: 176 MG/DL (CALC)
LYMPHOCYTES: 37.9 %
MCH: 31.1 PG (ref 27–33)
MCHC: 35.1 G/DL (ref 32–36)
MCV: 88.7 FL (ref 80–100)
MONOCYTES: 10.9 %
MPV: 10.6 FL (ref 7.5–12.5)
NEUTROPHILS: 46.9 %
NON-HDL CHOLESTEROL: 205 MG/DL (CALC)
PLATELET COUNT: 278 THOUSAND/UL (ref 140–400)
POTASSIUM: 4 MMOL/L (ref 3.5–5.3)
PROTEIN, TOTAL, RANDOM UR: 61 MG/DL (ref 5–25)
PROTEIN, TOTAL: 6.9 G/DL (ref 6.1–8.1)
RDW: 12.8 % (ref 11–15)
RED BLOOD CELL COUNT: 4.53 MILLION/UL (ref 4.2–5.8)
SODIUM: 141 MMOL/L (ref 135–146)
TRIGLYCERIDES: 146 MG/DL
TSH: 1.41 MIU/L (ref 0.4–4.5)
WHITE BLOOD CELL COUNT: 7.1 THOUSAND/UL (ref 3.8–10.8)

## 2020-02-27 ENCOUNTER — OFFICE VISIT (OUTPATIENT)
Dept: FAMILY MEDICINE CLINIC | Facility: CLINIC | Age: 55
End: 2020-02-27
Payer: COMMERCIAL

## 2020-02-27 VITALS
RESPIRATION RATE: 17 BRPM | HEART RATE: 76 BPM | HEIGHT: 74 IN | BODY MASS INDEX: 33 KG/M2 | SYSTOLIC BLOOD PRESSURE: 151 MMHG | DIASTOLIC BLOOD PRESSURE: 76 MMHG

## 2020-02-27 DIAGNOSIS — E11.9 TYPE 2 DIABETES MELLITUS WITHOUT COMPLICATION, WITHOUT LONG-TERM CURRENT USE OF INSULIN (HCC): ICD-10-CM

## 2020-02-27 DIAGNOSIS — E78.5 HYPERLIPIDEMIA, UNSPECIFIED HYPERLIPIDEMIA TYPE: ICD-10-CM

## 2020-02-27 DIAGNOSIS — I10 ESSENTIAL HYPERTENSION: ICD-10-CM

## 2020-02-27 DIAGNOSIS — M79.642 LEFT HAND PAIN: ICD-10-CM

## 2020-02-27 DIAGNOSIS — R82.90 ABNORMAL URINE ODOR: Primary | ICD-10-CM

## 2020-02-27 PROCEDURE — 99214 OFFICE O/P EST MOD 30 MIN: CPT | Performed by: FAMILY MEDICINE

## 2020-02-27 RX ORDER — ENALAPRIL MALEATE 5 MG/1
5 TABLET ORAL DAILY
Qty: 30 TABLET | Refills: 2 | Status: SHIPPED | OUTPATIENT
Start: 2020-02-27 | End: 2020-06-03

## 2020-02-27 RX ORDER — SIMVASTATIN 10 MG
10 TABLET ORAL DAILY
Qty: 90 TABLET | Refills: 3 | Status: SHIPPED | OUTPATIENT
Start: 2020-02-27 | End: 2021-02-21

## 2020-02-27 RX ORDER — METFORMIN HYDROCHLORIDE 1000 MG/1
1000 TABLET, FILM COATED, EXTENDED RELEASE ORAL
Qty: 30 TABLET | Refills: 2 | Status: SHIPPED | OUTPATIENT
Start: 2020-02-27 | End: 2021-02-25

## 2020-02-27 RX ORDER — GLIPIZIDE 10 MG/1
10 TABLET ORAL
Qty: 60 TABLET | Refills: 2 | Status: SHIPPED | OUTPATIENT
Start: 2020-02-27 | End: 2020-05-26

## 2020-02-27 RX ORDER — HYDROCHLOROTHIAZIDE 25 MG/1
25 TABLET ORAL DAILY
Qty: 30 TABLET | Refills: 2 | Status: SHIPPED | OUTPATIENT
Start: 2020-02-27 | End: 2020-06-03

## 2020-02-27 NOTE — PATIENT INSTRUCTIONS
Patient has been encouraged to comply with all medications regarding diabetes (glipizide and metformin).   Patient has been educated as to why enalapril is important to take on a daily basis regardless of blood pressure status (this is an arterial vessel [place]

## 2020-02-28 NOTE — PROGRESS NOTES
HPI:    Patient ID: Peng Nam is a 60year old male. This patient is a 49-year-old -American male well-known to our clinic who was treated for hypertension and diabetes but admittedly not compliant with all his medications.   Patient is here 2   • Glucose Blood (ONETOUCH VERIO) In Vitro Strip Check 1-2 times a day.  50 each 0   • ONETOUCH DELICA LANCETS 64W Does not apply Misc Check once a day 100 each 0   • ibuprofen (ADVIL) 200 MG Oral Tab Take 200 mg by mouth every 6 (six) hours as needed fo long-term current use of insulin ([facility]  Occasion reviewed and renewed. - metFORMIN HCl ER, MOD, 1000 MG (MOD) Oral Tablet 24 Hr; Take 1 tablet (1,000 mg total) by mouth daily with breakfast.  Dispense: 30 tablet; Refill: 2  - glipiZIDE 10 MG Oral Tab;  Qiana Rase

## 2020-02-29 LAB
BILIRUBIN: NEGATIVE
COLOR: YELLOW
KETONES: NEGATIVE
NITRITE: NEGATIVE
OCCULT BLOOD: NEGATIVE
PH: 7 (ref 5–8)
SPECIFIC GRAVITY: 1.02 (ref 1–1.03)

## 2020-03-01 DIAGNOSIS — B96.20 E. COLI UTI: Primary | ICD-10-CM

## 2020-03-01 DIAGNOSIS — N39.0 E. COLI UTI: Primary | ICD-10-CM

## 2020-03-01 RX ORDER — CIPROFLOXACIN 250 MG/1
250 TABLET, FILM COATED ORAL 2 TIMES DAILY
Qty: 14 TABLET | Refills: 0 | Status: SHIPPED | OUTPATIENT
Start: 2020-03-01 | End: 2020-03-08

## 2020-03-14 ENCOUNTER — TELEPHONE (OUTPATIENT)
Dept: FAMILY MEDICINE CLINIC | Facility: CLINIC | Age: 55
End: 2020-03-14

## 2020-03-18 NOTE — TELEPHONE ENCOUNTER
Dr. Parrish Severe,    Intermittent leave recert for Left hand pain  Please sign off on form:  -Highlight the patient and hit \"Chart\" button. -In Chart Review, w/in the Encounter tab - click 1 time on the Telephone call encounter for 3/14/20.  Scroll down the

## 2020-03-20 NOTE — TELEPHONE ENCOUNTER
LEELA completed and faxed to Adrienne Reese [phone].  Sent pt [employer] message

## 2020-05-26 DIAGNOSIS — E11.9 TYPE 2 DIABETES MELLITUS WITHOUT COMPLICATION, WITHOUT LONG-TERM CURRENT USE OF INSULIN (HCC): ICD-10-CM

## 2020-05-26 RX ORDER — GLIPIZIDE 10 MG/1
10 TABLET ORAL
Qty: 180 TABLET | Refills: 0 | Status: SHIPPED | OUTPATIENT
Start: 2020-05-26 | End: 2020-09-11

## 2020-06-03 DIAGNOSIS — I10 ESSENTIAL HYPERTENSION: ICD-10-CM

## 2020-06-03 RX ORDER — ENALAPRIL MALEATE 5 MG/1
TABLET ORAL
Qty: 90 TABLET | Refills: 0 | Status: SHIPPED | OUTPATIENT
Start: 2020-06-03 | End: 2020-09-11

## 2020-06-03 RX ORDER — HYDROCHLOROTHIAZIDE 25 MG/1
TABLET ORAL
Qty: 90 TABLET | Refills: 0 | Status: SHIPPED | OUTPATIENT
Start: 2020-06-03 | End: 2020-09-11

## 2020-07-15 ENCOUNTER — OFFICE VISIT (OUTPATIENT)
Dept: FAMILY MEDICINE CLINIC | Facility: CLINIC | Age: 55
End: 2020-07-15
Payer: COMMERCIAL

## 2020-07-15 VITALS
WEIGHT: 272 LBS | SYSTOLIC BLOOD PRESSURE: 134 MMHG | BODY MASS INDEX: 34.91 KG/M2 | HEART RATE: 78 BPM | HEIGHT: 74 IN | DIASTOLIC BLOOD PRESSURE: 78 MMHG | TEMPERATURE: 97 F

## 2020-07-15 DIAGNOSIS — M79.642 LEFT HAND PAIN: Primary | ICD-10-CM

## 2020-07-15 DIAGNOSIS — E11.9 TYPE 2 DIABETES MELLITUS WITHOUT COMPLICATION, WITHOUT LONG-TERM CURRENT USE OF INSULIN (HCC): ICD-10-CM

## 2020-07-15 DIAGNOSIS — E78.5 DYSLIPIDEMIA: ICD-10-CM

## 2020-07-15 PROCEDURE — 99214 OFFICE O/P EST MOD 30 MIN: CPT | Performed by: FAMILY MEDICINE

## 2020-07-15 NOTE — PROGRESS NOTES
HPI:    Patient ID: All Myers is a 60year old male. This 13-year-old -American male presents again with chronic left hand pain which is secondary to repetitive use and not blamed on any isolated incident.   Resting the hand and pain medicati (VENTOLIN) 108 (90 BASE) MCG/ACT Inhalation Aero Soln Inhale 2 puffs into the lungs every 6 (six) hours as needed for Wheezing.  (Patient not taking: Reported on 7/15/2020 ) 2 Inhaler 2     Allergies:  Penicillins             HIVES     07/15/20  2455   BP:

## 2020-07-30 RX ORDER — SILDENAFIL 100 MG/1
100 TABLET, FILM COATED ORAL
Qty: 8 TABLET | Refills: 0 | Status: SHIPPED | OUTPATIENT
Start: 2020-07-30 | End: 2020-09-15

## 2020-07-30 RX ORDER — SILDENAFIL 100 MG/1
TABLET, FILM COATED ORAL
Qty: 2 TABLET | Refills: 3 | Status: SHIPPED | OUTPATIENT
Start: 2020-07-30

## 2020-07-30 NOTE — TELEPHONE ENCOUNTER
Patient called requesting refill for the following medication.     Sildenafil Citrate (VIAGRA) 100 MG Oral Tab

## 2020-09-11 DIAGNOSIS — E11.9 TYPE 2 DIABETES MELLITUS WITHOUT COMPLICATION, WITHOUT LONG-TERM CURRENT USE OF INSULIN (HCC): ICD-10-CM

## 2020-09-11 DIAGNOSIS — I10 ESSENTIAL HYPERTENSION: ICD-10-CM

## 2020-09-11 RX ORDER — HYDROCHLOROTHIAZIDE 25 MG/1
TABLET ORAL
Qty: 90 TABLET | Refills: 0 | Status: SHIPPED | OUTPATIENT
Start: 2020-09-11 | End: 2021-02-25

## 2020-09-11 RX ORDER — GLIPIZIDE 10 MG/1
TABLET ORAL
Qty: 180 TABLET | Refills: 0 | Status: SHIPPED | OUTPATIENT
Start: 2020-09-11 | End: 2021-02-25

## 2020-09-11 RX ORDER — ENALAPRIL MALEATE 5 MG/1
TABLET ORAL
Qty: 90 TABLET | Refills: 0 | Status: SHIPPED | OUTPATIENT
Start: 2020-09-11 | End: 2021-02-25

## 2020-09-15 ENCOUNTER — OFFICE VISIT (OUTPATIENT)
Dept: FAMILY MEDICINE CLINIC | Facility: CLINIC | Age: 55
End: 2020-09-15
Payer: COMMERCIAL

## 2020-09-15 VITALS
TEMPERATURE: 97 F | HEIGHT: 74 IN | HEART RATE: 69 BPM | WEIGHT: 270 LBS | RESPIRATION RATE: 17 BRPM | SYSTOLIC BLOOD PRESSURE: 150 MMHG | BODY MASS INDEX: 34.65 KG/M2 | DIASTOLIC BLOOD PRESSURE: 70 MMHG

## 2020-09-15 DIAGNOSIS — E11.9 TYPE 2 DIABETES MELLITUS WITHOUT COMPLICATION, WITHOUT LONG-TERM CURRENT USE OF INSULIN (HCC): ICD-10-CM

## 2020-09-15 DIAGNOSIS — S91.339A PUNCTURE WOUND OF FOOT, UNSPECIFIED LATERALITY, INITIAL ENCOUNTER: ICD-10-CM

## 2020-09-15 DIAGNOSIS — L60.8 TOENAIL DEFORMITY: Primary | ICD-10-CM

## 2020-09-15 DIAGNOSIS — M79.642 LEFT HAND PAIN: ICD-10-CM

## 2020-09-15 DIAGNOSIS — Z23 NEED FOR DIPHTHERIA-TETANUS-PERTUSSIS (TDAP) VACCINE: ICD-10-CM

## 2020-09-15 DIAGNOSIS — E78.5 DYSLIPIDEMIA: ICD-10-CM

## 2020-09-15 PROCEDURE — 3078F DIAST BP <80 MM HG: CPT | Performed by: FAMILY MEDICINE

## 2020-09-15 PROCEDURE — 3008F BODY MASS INDEX DOCD: CPT | Performed by: FAMILY MEDICINE

## 2020-09-15 PROCEDURE — 90715 TDAP VACCINE 7 YRS/> IM: CPT | Performed by: FAMILY MEDICINE

## 2020-09-15 PROCEDURE — 90471 IMMUNIZATION ADMIN: CPT | Performed by: FAMILY MEDICINE

## 2020-09-15 PROCEDURE — 99214 OFFICE O/P EST MOD 30 MIN: CPT | Performed by: FAMILY MEDICINE

## 2020-09-15 PROCEDURE — 3077F SYST BP >= 140 MM HG: CPT | Performed by: FAMILY MEDICINE

## 2020-09-15 NOTE — PATIENT INSTRUCTIONS
Tdap ordered and administered. Patient referred to podiatry secondary to toenail growth deformity. Medication reviewed and renewed where needed and appropriate. Comply with medications.   Recommend weight loss via daily exercising and consistent healthy

## 2020-09-23 NOTE — PROGRESS NOTES
HPI:    Patient ID: Tremaine Adkins is a 60year old male. This patient is a 57-year-old -American male who is well-established at this clinic who presents today with continuation of left hand pain.   The pain is constant, however there are times w • IBUPROFEN 800 MG Oral Tab TAKE 1 TABLET BY MOUTH EVERY 8 HOURS AS NEEDED FOR PAIN 90 tablet 0   • Continuous Blood Gluc Sensor (FREESTYLE JULIETTE 14 DAY SENSOR) Does not apply Misc 1 each by Does not apply route every 14 (fourteen) days.  6 each 0   • Tadal Status to be updated on next preventive care/diabetic assessment. 5. Puncture wound of foot, unspecified laterality, initial encounter  Tdap has been ordered. There is no sign of infection nor is there any drainage.     6. Left hand pain  No change; sti

## 2020-11-24 ENCOUNTER — IMMUNIZATION (OUTPATIENT)
Dept: FAMILY MEDICINE CLINIC | Facility: CLINIC | Age: 55
End: 2020-11-24
Payer: COMMERCIAL

## 2020-11-24 DIAGNOSIS — Z23 NEED FOR VACCINATION: ICD-10-CM

## 2020-11-24 PROCEDURE — 90686 IIV4 VACC NO PRSV 0.5 ML IM: CPT | Performed by: FAMILY MEDICINE

## 2020-11-24 PROCEDURE — 90471 IMMUNIZATION ADMIN: CPT | Performed by: FAMILY MEDICINE

## 2020-12-22 ENCOUNTER — TELEPHONE (OUTPATIENT)
Dept: FAMILY MEDICINE CLINIC | Facility: CLINIC | Age: 55
End: 2020-12-22

## 2020-12-22 NOTE — TELEPHONE ENCOUNTER
Spoke with patient--requesting refill for Ibuprofen, \"for the soreness in my arm, since I got the flu shot (11/24/2020 left deltoid). 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  Other than that, I feel a whole ot stronge

## 2020-12-23 RX ORDER — IBUPROFEN 800 MG/1
800 TABLET ORAL EVERY 8 HOURS PRN
Qty: 90 TABLET | Refills: 0 | Status: SHIPPED | OUTPATIENT
Start: 2020-12-23

## 2020-12-23 NOTE — TELEPHONE ENCOUNTER
Left message informing patient that Ibuprofen sent to pharmacy.    Patient gave RN verbal authorization to leave detailed message (see message below)

## 2021-02-25 ENCOUNTER — TELEPHONE (OUTPATIENT)
Dept: FAMILY MEDICINE CLINIC | Facility: CLINIC | Age: 56
End: 2021-02-25

## 2021-02-25 ENCOUNTER — OFFICE VISIT (OUTPATIENT)
Dept: FAMILY MEDICINE CLINIC | Facility: CLINIC | Age: 56
End: 2021-02-25
Payer: COMMERCIAL

## 2021-02-25 VITALS
BODY MASS INDEX: 35 KG/M2 | HEIGHT: 74 IN | TEMPERATURE: 98 F | DIASTOLIC BLOOD PRESSURE: 83 MMHG | HEART RATE: 75 BPM | SYSTOLIC BLOOD PRESSURE: 142 MMHG | RESPIRATION RATE: 17 BRPM

## 2021-02-25 DIAGNOSIS — I10 ESSENTIAL HYPERTENSION: ICD-10-CM

## 2021-02-25 DIAGNOSIS — E11.9 TYPE 2 DIABETES MELLITUS WITHOUT COMPLICATION, WITHOUT LONG-TERM CURRENT USE OF INSULIN (HCC): Primary | ICD-10-CM

## 2021-02-25 DIAGNOSIS — L60.8 DEFORMITY OF TOENAIL: ICD-10-CM

## 2021-02-25 DIAGNOSIS — M79.642 LEFT HAND PAIN: ICD-10-CM

## 2021-02-25 DIAGNOSIS — E11.9 TYPE 2 DIABETES MELLITUS WITHOUT COMPLICATION, WITHOUT LONG-TERM CURRENT USE OF INSULIN (HCC): ICD-10-CM

## 2021-02-25 DIAGNOSIS — E78.5 HYPERLIPIDEMIA, UNSPECIFIED HYPERLIPIDEMIA TYPE: ICD-10-CM

## 2021-02-25 PROCEDURE — 3079F DIAST BP 80-89 MM HG: CPT | Performed by: FAMILY MEDICINE

## 2021-02-25 PROCEDURE — 3077F SYST BP >= 140 MM HG: CPT | Performed by: FAMILY MEDICINE

## 2021-02-25 PROCEDURE — 99214 OFFICE O/P EST MOD 30 MIN: CPT | Performed by: FAMILY MEDICINE

## 2021-02-25 RX ORDER — ENALAPRIL MALEATE 5 MG/1
5 TABLET ORAL DAILY
Qty: 90 TABLET | Refills: 0 | Status: SHIPPED | OUTPATIENT
Start: 2021-02-25 | End: 2021-09-27

## 2021-02-25 RX ORDER — ATORVASTATIN CALCIUM 20 MG/1
20 TABLET, FILM COATED ORAL NIGHTLY
Qty: 30 TABLET | Refills: 2 | Status: SHIPPED | OUTPATIENT
Start: 2021-02-25

## 2021-02-25 RX ORDER — METFORMIN HYDROCHLORIDE 1000 MG/1
1000 TABLET, FILM COATED, EXTENDED RELEASE ORAL
Qty: 30 TABLET | Refills: 2 | Status: SHIPPED | OUTPATIENT
Start: 2021-02-25 | End: 2021-03-01

## 2021-02-25 RX ORDER — GLIPIZIDE 10 MG/1
10 TABLET ORAL
Qty: 180 TABLET | Refills: 0 | Status: SHIPPED | OUTPATIENT
Start: 2021-02-25

## 2021-02-25 RX ORDER — HYDROCHLOROTHIAZIDE 25 MG/1
25 TABLET ORAL DAILY
Qty: 90 TABLET | Refills: 1 | Status: SHIPPED | OUTPATIENT
Start: 2021-02-25 | End: 2021-12-20

## 2021-02-25 NOTE — PROGRESS NOTES
HPI:    Patient ID: Lindsay Trujillo is a 60year old male. This patient is a 38-year-old -American male who is well-established at this clinic who presents today with continuation of left hand pain.   The pain is constant, however there are times w total) by mouth 2 (two) times daily with meals. 60 tablet 2   • Tadalafil (CIALIS) 20 MG Oral Tab Take 1 tablet (20 mg total) by mouth daily as needed for Erectile Dysfunction.  8 tablet 2   • Glucose Blood (ONETOUCH VERIO) In Vitro Strip Check 1-2 times a meals.  Dispense: 180 tablet; Refill: 0    2. Left hand pain  Status quo.    3. Deformity of toenail  Referred. - PODIATRY - INTERNAL    4. Essential hypertension  Just short of goal when measured manually by physician.   - hydrochlorothiazide 25 MG Oral T worsen or fail to improve.          RF#[id number]

## 2021-02-25 NOTE — TELEPHONE ENCOUNTER
Per [facility], a prior authorization has been started for patient's Metformin 1000mg ER Tablets. To submit the prior authorization, login to [email or web address] and click \"Enter a Key\".  Enter the patient's last name, date of birth and the Key:    Key: [id number]

## 2021-02-25 NOTE — TELEPHONE ENCOUNTER
Prior authorization for Metformin HCl 1000mg MOD completed w/ [facility] on cover my meds Key: [id number], turn around time 1-5 days.

## 2021-02-27 NOTE — TELEPHONE ENCOUNTER
The patient has been on the twice daily metformin in the past.  I just switched it back to the twice daily form and sent to pharmacy. Please call patient and let him know.

## 2021-03-01 NOTE — TELEPHONE ENCOUNTER
Spoke to patient to inform him that metformin ER MOD 1000 mg once a day was denied and metformin 1000 mg BID was sent to the pharmacy instead

## 2021-06-24 ENCOUNTER — OFFICE VISIT (OUTPATIENT)
Dept: FAMILY MEDICINE CLINIC | Facility: CLINIC | Age: 56
End: 2021-06-24
Payer: COMMERCIAL

## 2021-06-24 VITALS
DIASTOLIC BLOOD PRESSURE: 78 MMHG | SYSTOLIC BLOOD PRESSURE: 140 MMHG | WEIGHT: 273 LBS | BODY MASS INDEX: 35.04 KG/M2 | RESPIRATION RATE: 17 BRPM | HEIGHT: 74 IN | HEART RATE: 72 BPM | TEMPERATURE: 97 F

## 2021-06-24 DIAGNOSIS — E11.9 TYPE 2 DIABETES MELLITUS WITHOUT COMPLICATION, WITHOUT LONG-TERM CURRENT USE OF INSULIN (HCC): Primary | ICD-10-CM

## 2021-06-24 PROCEDURE — 3008F BODY MASS INDEX DOCD: CPT | Performed by: FAMILY MEDICINE

## 2021-06-24 PROCEDURE — 3078F DIAST BP <80 MM HG: CPT | Performed by: FAMILY MEDICINE

## 2021-06-24 PROCEDURE — 3051F HG A1C>EQUAL 7.0%<8.0%: CPT | Performed by: FAMILY MEDICINE

## 2021-06-24 PROCEDURE — 3077F SYST BP >= 140 MM HG: CPT | Performed by: FAMILY MEDICINE

## 2021-06-24 PROCEDURE — 99214 OFFICE O/P EST MOD 30 MIN: CPT | Performed by: FAMILY MEDICINE

## 2021-06-24 NOTE — PROGRESS NOTES
HPI/Subjective:   Patient ID: Erma Garzon is a 60year old male. This patient is a 49-year-old -American male who is well-established at this clinic who presents today with continuation of left hand pain.   The pain is constant, however there a tablet 0   • SILDENAFIL CITRATE 100 MG Oral Tab TAKE 1 TABLET BY MOUTH EVERY DAY AS NEEDED 2 tablet 3   • Continuous Blood Gluc Sensor ([employer]YLE JULIETTE 14 DAY SENSOR) Does not apply Misc 1 each by Does not apply route every 14 (fourteen) days.  6 each 0   • - INTERNAL  - HEMOGLOBIN A1C  - LIPID PANEL  - PROTEIN AND CREATININE, RANDOM URINE  - COMP METABOLIC PANEL (14)  - HEMOGLOBIN A1C  - ASSAY QUANTITATIVE, GLUCOSE    No orders of the defined types were placed in this encounter.       Meds This Visit:  Richard

## 2021-06-24 NOTE — PATIENT INSTRUCTIONS
Diabetic status update via blood and urine. Pneumovax and shingles vaccine have been offered to the patient, but he would give some consideration to that and let us know if and when he wants to get those vaccines.   Thorough discussion conducted regarding

## 2021-07-01 LAB
CHOL/HDLC RATIO: 4.8 (CALC)
CHOLESTEROL, TOTAL: 233 MG/DL
CREATININE, RANDOM URINE: 153 MG/DL (ref 20–320)
HDL CHOLESTEROL: 49 MG/DL
HEMOGLOBIN A1C: 7.5 % OF TOTAL HGB
LDL-CHOLESTEROL: 153 MG/DL (CALC)
NON-HDL CHOLESTEROL: 184 MG/DL (CALC)
PROTEIN, TOTAL, RANDOM UR: 81 MG/DL (ref 5–25)
PROTEIN/CREATININE RATIO: 0.53 MG/MG CREAT (ref 0.02–0.13)
PROTEIN/CREATININE RATIO: 529 MG/G CREAT (ref 22–128)
TRIGLYCERIDES: 174 MG/DL

## 2021-09-07 ENCOUNTER — APPOINTMENT (OUTPATIENT)
Dept: GENERAL RADIOLOGY | Age: 56
End: 2021-09-07
Attending: EMERGENCY MEDICINE
Payer: COMMERCIAL

## 2021-09-07 ENCOUNTER — HOSPITAL ENCOUNTER (OUTPATIENT)
Age: 56
Discharge: HOME | End: 2021-09-07
Payer: COMMERCIAL

## 2021-09-07 ENCOUNTER — NURSE TRIAGE (OUTPATIENT)
Dept: FAMILY MEDICINE CLINIC | Facility: CLINIC | Age: 56
End: 2021-09-07

## 2021-09-07 VITALS
DIASTOLIC BLOOD PRESSURE: 79 MMHG | OXYGEN SATURATION: 97 % | RESPIRATION RATE: 18 BRPM | HEART RATE: 65 BPM | SYSTOLIC BLOOD PRESSURE: 176 MMHG | TEMPERATURE: 99 F

## 2021-09-07 DIAGNOSIS — M11.20 PSEUDOGOUT: Primary | ICD-10-CM

## 2021-09-07 DIAGNOSIS — M25.561 ARTHRALGIA OF RIGHT LOWER LEG: ICD-10-CM

## 2021-09-07 DIAGNOSIS — Z86.39 HISTORY OF DIABETES MELLITUS, TYPE II: ICD-10-CM

## 2021-09-07 DIAGNOSIS — I10 HYPERTENSION, UNSPECIFIED TYPE: ICD-10-CM

## 2021-09-07 PROCEDURE — 73590 X-RAY EXAM OF LOWER LEG: CPT | Performed by: EMERGENCY MEDICINE

## 2021-09-07 PROCEDURE — 99204 OFFICE O/P NEW MOD 45 MIN: CPT | Performed by: EMERGENCY MEDICINE

## 2021-09-07 RX ORDER — METHOCARBAMOL 500 MG/1
500 TABLET, FILM COATED ORAL 3 TIMES DAILY PRN
Qty: 10 TABLET | Refills: 0 | Status: SHIPPED | OUTPATIENT
Start: 2021-09-07 | End: 2021-09-23

## 2021-09-07 NOTE — ED PROVIDER NOTES
Patient Seen in: [facility]      History   Patient presents with:  Leg Pain    Stated Complaint: R leg pain    HPI/Subjective:   HPI  Cristopher West is a 60year old male here for right leg pain.   Patient was sent in by his primary care prov round, and reactive to light. Cardiovascular:      Pulses: Normal pulses. Pulmonary:      Effort: Pulmonary effort is normal. No respiratory distress. Musculoskeletal:      Cervical back: Normal range of motion.       Comments: Reproducible right leg as needed for muscle relaxant  Tylenol as needed for pain  Told to avoid ibuprofen, and decreased salt intake for blood pressure control.     Medical Record Review/reassessment: I independently  reviewed available prior medical records for any recent pertin

## 2021-09-07 NOTE — TELEPHONE ENCOUNTER
Action Requested: Summary for Provider     []  Critical Lab, Recommendations Needed  [] Need Additional Advice  [x]   FYI    []   Need Orders  [] Need Medications Sent to Pharmacy  []  Other     SUMMARY: Since last week has been having on and off right m

## 2021-09-07 NOTE — TELEPHONE ENCOUNTER
Patient was seen in urgent care today.      Disposition and Plan    Clinical Impression:  Pseudogout  (primary encounter diagnosis)  Arthralgia of right lower leg  History of diabetes mellitus, type II  Hypertension, unspecified type      Disposition:  Disc

## 2021-09-08 ENCOUNTER — TELEPHONE (OUTPATIENT)
Dept: NEUROLOGY | Facility: CLINIC | Age: 56
End: 2021-09-08

## 2021-09-09 DIAGNOSIS — E11.9 TYPE 2 DIABETES MELLITUS WITHOUT COMPLICATION, WITHOUT LONG-TERM CURRENT USE OF INSULIN (HCC): ICD-10-CM

## 2021-09-09 NOTE — TELEPHONE ENCOUNTER
Refill passed per [address] protocol.       Requested Prescriptions   Pending Prescriptions Disp Refills    METFORMIN HCL 1000 MG Oral Tab [Pharmacy Med Name: METFORMIN HCL 1,000 MG TABLET] 180 tablet 1     Sig: TAKE 1 TABLET BY MOUTH TWICE A DAY WITH MEALS        Diabetes Medication Protocol Passed - 9/9/2021 12:20 AM        Passed - Last A1C < 7.5 and within past 6 months     Lab Results   Component Value Date    A1C 7.5 (H) 06/24/2021             Passed - Appointment in past 6 or next 3 months        Passed - GFR  > 50     Lab Results   Component Value Date    GFRAA 79 06/24/2021               Passed - GFR in the past 12 months               Future Appointments         Provider Department Appt Notes    In 2 weeks Chari Mcdonough DO [address], [facility] 3 month f/u            Recent Outpatient Visits              2 months ago Type 2 diabetes mellitus without complication, without long-term current use of insulin [facility])    [address] [place], [address], [place]    Office Visit    6 months ago Type 2 diabetes mellitus without complication, without long-term current use of insulin [facility])    [address], [place] [facility]    Office Visit    11 months ago Toenail deformity    [address], Lele Roca DO    Office Visit    1 year ago Left hand pain    [address], Le[place] Roca DO    Office Visit    1 year ago Abnormal urine odor    [address], [place]    Office Visit

## 2021-09-23 ENCOUNTER — OFFICE VISIT (OUTPATIENT)
Dept: FAMILY MEDICINE CLINIC | Facility: CLINIC | Age: 56
End: 2021-09-23
Payer: COMMERCIAL

## 2021-09-23 VITALS
HEIGHT: 74 IN | DIASTOLIC BLOOD PRESSURE: 74 MMHG | TEMPERATURE: 98 F | SYSTOLIC BLOOD PRESSURE: 130 MMHG | BODY MASS INDEX: 35.04 KG/M2 | WEIGHT: 273 LBS | HEART RATE: 69 BPM | RESPIRATION RATE: 18 BRPM

## 2021-09-23 DIAGNOSIS — E11.9 TYPE 2 DIABETES MELLITUS WITHOUT COMPLICATION, WITHOUT LONG-TERM CURRENT USE OF INSULIN (HCC): Primary | ICD-10-CM

## 2021-09-23 DIAGNOSIS — E78.5 HYPERLIPIDEMIA, UNSPECIFIED HYPERLIPIDEMIA TYPE: ICD-10-CM

## 2021-09-23 DIAGNOSIS — G89.29 CHRONIC PAIN OF RIGHT KNEE: ICD-10-CM

## 2021-09-23 DIAGNOSIS — M25.561 CHRONIC PAIN OF RIGHT KNEE: ICD-10-CM

## 2021-09-23 DIAGNOSIS — M65.9 TENOSYNOVITIS OF LEFT WRIST: ICD-10-CM

## 2021-09-23 DIAGNOSIS — M79.642 LEFT HAND PAIN: ICD-10-CM

## 2021-09-23 PROCEDURE — 99214 OFFICE O/P EST MOD 30 MIN: CPT | Performed by: FAMILY MEDICINE

## 2021-09-23 PROCEDURE — 3075F SYST BP GE 130 - 139MM HG: CPT | Performed by: FAMILY MEDICINE

## 2021-09-23 PROCEDURE — 3008F BODY MASS INDEX DOCD: CPT | Performed by: FAMILY MEDICINE

## 2021-09-23 PROCEDURE — 3078F DIAST BP <80 MM HG: CPT | Performed by: FAMILY MEDICINE

## 2021-09-23 RX ORDER — METHOCARBAMOL 500 MG/1
500 TABLET, FILM COATED ORAL 3 TIMES DAILY PRN
Qty: 60 TABLET | Refills: 0 | Status: SHIPPED | OUTPATIENT
Start: 2021-09-23

## 2021-09-23 NOTE — PROGRESS NOTES
Subjective:   Patient ID: Alma Adams is a 60year old male.     This patient is a 53-year-old -American male who is well-established at our clinic who is treated for hypertension and diabetes and also has a chronic tenosynovitis involving the lef (fourteen) days. 6 each 0   • Tadalafil (CIALIS) 20 MG Oral Tab Take 1 tablet (20 mg total) by mouth daily as needed for Erectile Dysfunction. 8 tablet 2   • Glucose Blood (ONETOUCH VERIO) In Vitro Strip Check 1-2 times a day.  48 each 0   • [facility] next blood draw visit. 5. Chronic pain of right knee  Medication reviewed and renewed. - methocarbamol 500 MG Oral Tab; Take 1 tablet (500 mg total) by mouth 3 (three) times daily as needed. Dispense: 60 tablet;  Refill: 0    No orders of the defined t

## 2021-09-23 NOTE — PATIENT INSTRUCTIONS
Methocarbamol refill for the patient's right knee. FMLA remains the same regarding the patient's left hand and wrist pain secondary to tenosynovitis. Continue to comply with medication regarding blood pressure and diabetes.   Exercise 20 minutes a day 4 d

## 2021-09-27 DIAGNOSIS — I10 ESSENTIAL HYPERTENSION: ICD-10-CM

## 2021-09-27 RX ORDER — ENALAPRIL MALEATE 5 MG/1
5 TABLET ORAL DAILY
Qty: 90 TABLET | Refills: 1 | Status: SHIPPED | OUTPATIENT
Start: 2021-09-27 | End: 2022-03-29

## 2021-12-20 DIAGNOSIS — I10 ESSENTIAL HYPERTENSION: ICD-10-CM

## 2021-12-20 RX ORDER — HYDROCHLOROTHIAZIDE 25 MG/1
25 TABLET ORAL DAILY
Qty: 90 TABLET | Refills: 1 | Status: SHIPPED | OUTPATIENT
Start: 2021-12-20 | End: 2022-07-02

## 2021-12-20 NOTE — TELEPHONE ENCOUNTER
Refill passed per [facility] protocol.   Requested Prescriptions   Pending Prescriptions Disp Refills    HYDROCHLOROTHIAZIDE 25 MG Oral Tab [Pharmacy Med Name: HYDROCHLOROTHIAZIDE 25 MG TAB] 90 tablet 1     Sig: TAKE 1 TABLET BY MOUTH EVERY DAY        Hypertensive Medications Protocol Passed - 12/20/2021 12:15 AM        Passed - CMP or BMP in past 12 months        Passed - Appointment in past 6 or next 3 months        Passed - GFR  > 50     Lab Results   Component Value Date    GFRAA 79 06/24/2021                      Recent Outpatient Visits              2 months ago Type 2 diabetes mellitus without complication, without long-term current use of insulin [facility])    [place] [facility], Chanelle Perdue Benedetta Adam, DO    Office Visit    5 months ago Type 2 diabetes mellitus without complication, without long-term current use of insulin [facility])    [facility], Chanelle Perdue Benedetta Adam, DO    Office Visit    9 months ago Type 2 diabetes mellitus without complication, without long-term current use of insulin [facility])    [place] [facility], Chanelle Perdue Benedetta Adam, DO    Office Visit    1 year ago Toenail deformity    [facility], Chanelle Perdue Benedetta Adam, DO    Office Visit    1 year ago Left hand pain    [facility], Chanelle Perdue Benedetta Adam, DO    Office Visit           Future Appointments         Provider Department Appt Notes    In 3 days Trinidad Farmer DO [place] [facility], Radha Martin [place] 3 month f/u

## 2022-03-21 ENCOUNTER — TELEPHONE (OUTPATIENT)
Dept: FAMILY MEDICINE CLINIC | Facility: CLINIC | Age: 57
End: 2022-03-21

## 2022-03-22 NOTE — TELEPHONE ENCOUNTER
Patient stopped in the [facility], dropped off FMLA forms for completion. Forms emailed to [email or web address], original forms left in the [facility]. Please let patient know when the forms are completed, he would like to  a copy of them. Patient paid the $25 fee.

## 2022-03-29 ENCOUNTER — OFFICE VISIT (OUTPATIENT)
Dept: FAMILY MEDICINE CLINIC | Facility: CLINIC | Age: 57
End: 2022-03-29
Payer: COMMERCIAL

## 2022-03-29 VITALS
HEIGHT: 74 IN | WEIGHT: 259.13 LBS | HEART RATE: 68 BPM | BODY MASS INDEX: 33.26 KG/M2 | DIASTOLIC BLOOD PRESSURE: 82 MMHG | TEMPERATURE: 98 F | SYSTOLIC BLOOD PRESSURE: 139 MMHG

## 2022-03-29 DIAGNOSIS — I10 ESSENTIAL HYPERTENSION: ICD-10-CM

## 2022-03-29 DIAGNOSIS — E11.9 TYPE 2 DIABETES MELLITUS WITHOUT COMPLICATION, WITHOUT LONG-TERM CURRENT USE OF INSULIN (HCC): ICD-10-CM

## 2022-03-29 DIAGNOSIS — M79.642 LEFT HAND PAIN: Primary | ICD-10-CM

## 2022-03-29 PROCEDURE — 3079F DIAST BP 80-89 MM HG: CPT | Performed by: FAMILY MEDICINE

## 2022-03-29 PROCEDURE — 3075F SYST BP GE 130 - 139MM HG: CPT | Performed by: FAMILY MEDICINE

## 2022-03-29 PROCEDURE — 3008F BODY MASS INDEX DOCD: CPT | Performed by: FAMILY MEDICINE

## 2022-03-29 PROCEDURE — 99214 OFFICE O/P EST MOD 30 MIN: CPT | Performed by: FAMILY MEDICINE

## 2022-03-29 RX ORDER — ENALAPRIL MALEATE 5 MG/1
5 TABLET ORAL DAILY
Qty: 90 TABLET | Refills: 1 | Status: SHIPPED | OUTPATIENT
Start: 2022-03-29

## 2022-03-29 RX ORDER — ENALAPRIL MALEATE 5 MG/1
TABLET ORAL
Qty: 90 TABLET | Refills: 1 | OUTPATIENT
Start: 2022-03-29

## 2022-03-29 RX ORDER — GLIPIZIDE 10 MG/1
10 TABLET ORAL
Qty: 180 TABLET | Refills: 0 | Status: SHIPPED | OUTPATIENT
Start: 2022-03-29

## 2022-03-29 NOTE — TELEPHONE ENCOUNTER
Patient called for status and timeline stated. He needs by Friday and told him to get an extension. Call patient when completed.

## 2022-03-29 NOTE — PATIENT INSTRUCTIONS
All adult screening ordered and done appropriate for patient's age and gender and risk factors and complaints. Recommend weight loss via daily exercising and consistent healthy dietary changes. Medication reviewed and renewed where needed and appropriate. Comply with medications. Monitor blood pressures and record at home. Limit salt intake. LA allowances to remain the same regarding left hand pain.

## 2022-04-04 NOTE — TELEPHONE ENCOUNTER
Dr. Sim Mathews,     Please sign off on form:  -Highlight the patient and hit \"Chart\" button. -In Chart Review, w/in the Encounter tab - click 1 time on the Telephone call encounter for 3/21/22 Scroll down the telephone encounter.  -Click \"scan on\" blue Hyperlink under \"Media\" heading for FMLA Dr. Sim Mathews 4/4/22 w/in the telephone enc.  -Click on Acknowledge button at the bottom right corner and left-click onto image, signature stamp appears and drag signature to Provider signature line. Stamp will turn blue. Close window.      Thank you,      Juan Jose Smith

## 2022-04-05 NOTE — TELEPHONE ENCOUNTER
Patient requesting fmla to be faxed today 4/5. He will try to  papers today in office.     Ph. [phone]

## 2022-04-08 ENCOUNTER — TELEPHONE (OUTPATIENT)
Dept: FAMILY MEDICINE CLINIC | Facility: CLINIC | Age: 57
End: 2022-04-08

## 2022-04-08 NOTE — TELEPHONE ENCOUNTER
[facility] spoke to Phil about diabetes care and noticed he previously received statin therapy but has not filled it at a [facility] pharmacy in the last 180 days. [facility] is reaching out on the patient's behalf to determine if it is appropriate to restart the statin therapy. Please send a new prescription for statin therapy if it is appropriate.

## 2022-04-11 RX ORDER — ATORVASTATIN CALCIUM 20 MG/1
20 TABLET, FILM COATED ORAL NIGHTLY
Qty: 90 TABLET | Refills: 1 | Status: SHIPPED | OUTPATIENT
Start: 2022-04-11

## 2022-04-11 NOTE — TELEPHONE ENCOUNTER
Left message to call back. Please transfer to triage. 1st attempt. Pharmacy request calling on behalf of pt.

## 2022-07-02 DIAGNOSIS — I10 ESSENTIAL HYPERTENSION: ICD-10-CM

## 2022-07-02 RX ORDER — HYDROCHLOROTHIAZIDE 25 MG/1
25 TABLET ORAL DAILY
Qty: 90 TABLET | Refills: 1 | Status: SHIPPED | OUTPATIENT
Start: 2022-07-02

## 2022-07-04 DIAGNOSIS — E11.9 TYPE 2 DIABETES MELLITUS WITHOUT COMPLICATION, WITHOUT LONG-TERM CURRENT USE OF INSULIN (HCC): ICD-10-CM

## 2022-07-04 RX ORDER — GLIPIZIDE 10 MG/1
TABLET ORAL
Qty: 180 TABLET | Refills: 0 | Status: SHIPPED | OUTPATIENT
Start: 2022-07-04

## 2022-10-17 ENCOUNTER — OFFICE VISIT (OUTPATIENT)
Dept: FAMILY MEDICINE CLINIC | Facility: CLINIC | Age: 57
End: 2022-10-17
Payer: COMMERCIAL

## 2022-10-17 VITALS
TEMPERATURE: 98 F | HEIGHT: 74 IN | WEIGHT: 269 LBS | DIASTOLIC BLOOD PRESSURE: 78 MMHG | BODY MASS INDEX: 34.52 KG/M2 | SYSTOLIC BLOOD PRESSURE: 160 MMHG | HEART RATE: 69 BPM

## 2022-10-17 DIAGNOSIS — M79.642 LEFT HAND PAIN: ICD-10-CM

## 2022-10-17 DIAGNOSIS — I10 ESSENTIAL HYPERTENSION: ICD-10-CM

## 2022-10-17 DIAGNOSIS — M65.9 TENOSYNOVITIS OF LEFT WRIST: ICD-10-CM

## 2022-10-17 DIAGNOSIS — E78.5 HYPERLIPIDEMIA, UNSPECIFIED HYPERLIPIDEMIA TYPE: ICD-10-CM

## 2022-10-17 DIAGNOSIS — E11.9 TYPE 2 DIABETES MELLITUS WITHOUT COMPLICATION, WITHOUT LONG-TERM CURRENT USE OF INSULIN (HCC): Primary | ICD-10-CM

## 2022-10-17 PROBLEM — E11.22 TYPE 2 DIABETES MELLITUS WITH DIABETIC CHRONIC KIDNEY DISEASE (HCC): Status: ACTIVE | Noted: 2022-10-17

## 2022-10-17 LAB
CARTRIDGE LOT#: ABNORMAL NUMERIC
HEMOGLOBIN A1C: 8 % (ref 4.3–5.6)

## 2022-10-17 PROCEDURE — 3077F SYST BP >= 140 MM HG: CPT | Performed by: FAMILY MEDICINE

## 2022-10-17 PROCEDURE — 83036 HEMOGLOBIN GLYCOSYLATED A1C: CPT | Performed by: FAMILY MEDICINE

## 2022-10-17 PROCEDURE — 99214 OFFICE O/P EST MOD 30 MIN: CPT | Performed by: FAMILY MEDICINE

## 2022-10-17 PROCEDURE — 3052F HG A1C>EQUAL 8.0%<EQUAL 9.0%: CPT | Performed by: FAMILY MEDICINE

## 2022-10-17 PROCEDURE — 3008F BODY MASS INDEX DOCD: CPT | Performed by: FAMILY MEDICINE

## 2022-10-17 PROCEDURE — 3078F DIAST BP <80 MM HG: CPT | Performed by: FAMILY MEDICINE

## 2022-10-17 RX ORDER — ENALAPRIL MALEATE 5 MG/1
5 TABLET ORAL DAILY
Qty: 90 TABLET | Refills: 1 | Status: SHIPPED | OUTPATIENT
Start: 2022-10-17

## 2022-10-17 NOTE — PATIENT INSTRUCTIONS
A1c in office. FMLA remains the same regarding the patient's left hand symptoms. Patient given a referral for complete funduscopic evaluation regarding diabetes. Enalapril refilled. Compliance encouraged. Recommend weight loss via daily exercising and consistent healthy dietary changes. Medication reviewed and renewed where needed and appropriate. Monitor blood pressures and record at home. Limit salt intake.

## 2022-11-03 DIAGNOSIS — E11.9 TYPE 2 DIABETES MELLITUS WITHOUT COMPLICATION, WITHOUT LONG-TERM CURRENT USE OF INSULIN (HCC): ICD-10-CM

## 2022-11-04 RX ORDER — GLIPIZIDE 10 MG/1
TABLET ORAL
Qty: 180 TABLET | Refills: 0 | Status: SHIPPED | OUTPATIENT
Start: 2022-11-04

## 2022-11-04 NOTE — TELEPHONE ENCOUNTER
Please review. Protocol failed / No Protocol.     Requested Prescriptions   Pending Prescriptions Disp Refills    GLIPIZIDE 10 MG Oral Tab [Pharmacy Med Name: GLIPIZIDE 10 MG TABLET] 180 tablet 0     Sig: TAKE 1 TABLET BY MOUTH TWICE A DAY BEFORE MEALS       Diabetes Medication Protocol Failed - 11/3/2022  1:49 AM        Failed - Last A1C < 7.5 and within past 6 months     Lab Results   Component Value Date    A1C 8.0 (A) 10/17/2022             Failed - EGFRCR or GFRAA > 50     GFR Evaluation            Failed - GFR in the past 12 months        Passed - In person appointment or virtual visit in the past 6 mos or appointment in next 3 mos     Recent Outpatient Visits              2 weeks ago Type 2 diabetes mellitus without complication, without long-term current use of insulin ([facility]    [facility], Radha Martin, Emely Roca, DO    Office Visit    7 months ago Left hand pain    [facility], Radha Martin, Emely Roca, DO    Office Visit    1 year ago Type 2 diabetes mellitus without complication, without long-term current use of insulin [facility])    [facility], Radha Martin, Emely Roca, DO    Office Visit    1 year ago Type 2 diabetes mellitus without complication, without long-term current use of insulin [facility])    [facility], Radha Martin, Emely Roca, DO    Office Visit    1 year ago Type 2 diabetes mellitus without complication, without long-term current use of insulin [facility])    [facility], Radha Martin, Emely Roca [place]    Office Visit

## 2022-12-08 ENCOUNTER — NURSE TRIAGE (OUTPATIENT)
Dept: FAMILY MEDICINE CLINIC | Facility: CLINIC | Age: 57
End: 2022-12-08

## 2022-12-09 ENCOUNTER — HOSPITAL ENCOUNTER (OUTPATIENT)
Age: 57
Discharge: HOME | End: 2022-12-09
Payer: COMMERCIAL

## 2022-12-09 VITALS
RESPIRATION RATE: 20 BRPM | OXYGEN SATURATION: 100 % | HEART RATE: 82 BPM | TEMPERATURE: 97 F | SYSTOLIC BLOOD PRESSURE: 168 MMHG | DIASTOLIC BLOOD PRESSURE: 86 MMHG

## 2022-12-09 DIAGNOSIS — M54.50 ACUTE LEFT-SIDED LOW BACK PAIN WITHOUT SCIATICA: Primary | ICD-10-CM

## 2022-12-09 PROCEDURE — 99213 OFFICE O/P EST LOW 20 MIN: CPT | Performed by: NURSE PRACTITIONER

## 2022-12-09 RX ORDER — NAPROXEN 500 MG/1
500 TABLET ORAL ONCE
Status: COMPLETED | OUTPATIENT
Start: 2022-12-09 | End: 2022-12-09

## 2022-12-09 RX ORDER — LIDOCAINE 4 G/G
1 PATCH TOPICAL EVERY 24 HOURS
Qty: 10 PATCH | Refills: 0 | Status: SHIPPED | OUTPATIENT
Start: 2022-12-09

## 2022-12-09 RX ORDER — NAPROXEN 500 MG/1
500 TABLET ORAL 2 TIMES DAILY PRN
Qty: 20 TABLET | Refills: 0 | Status: SHIPPED | OUTPATIENT
Start: 2022-12-09

## 2022-12-09 RX ORDER — CYCLOBENZAPRINE HCL 10 MG
10 TABLET ORAL 3 TIMES DAILY PRN
Qty: 20 TABLET | Refills: 0 | Status: SHIPPED | OUTPATIENT
Start: 2022-12-09

## 2022-12-09 NOTE — ED INITIAL ASSESSMENT (HPI)
Pt states was at work and thinks he pulled a muscle while getting up at work, pt states having pain in lower left side of back. Pt denies pain radiating to other location.

## 2022-12-09 NOTE — TELEPHONE ENCOUNTER
Patient called (identified name and ),   Decided to [facility], wanted address, given [address].

## 2023-02-27 ENCOUNTER — TELEPHONE (OUTPATIENT)
Facility: CLINIC | Age: 58
End: 2023-02-27

## 2023-02-27 NOTE — TELEPHONE ENCOUNTER
Left message for patient to call back for an appointment. Patient needs physical and care gap completion.

## 2023-03-03 DIAGNOSIS — E78.5 HYPERLIPIDEMIA, UNSPECIFIED HYPERLIPIDEMIA TYPE: ICD-10-CM

## 2023-03-03 DIAGNOSIS — I10 ESSENTIAL HYPERTENSION: ICD-10-CM

## 2023-03-03 NOTE — TELEPHONE ENCOUNTER
Last labs June 2021. Dr. Alban El please advise if you wish to order labs and on refill request.    Requested Prescriptions     Pending Prescriptions Disp Refills    HYDROCHLOROTHIAZIDE 25 MG Oral Tab [Pharmacy Med Name: HYDROCHLOROTHIAZIDE 25 MG TAB] 90 tablet 1     Sig: TAKE 1 TABLET (25 MG TOTAL) BY MOUTH DAILY. Recent Visits  Date Type Provider Dept   10/17/22 Office Visit Pat Martinez, DO [facility]   03/29/22 Office Visit Pat Martinez, DO [facility]   09/23/21 Office Visit Pat Martinez, DO [facility]   Showing recent visits within past 540 days with a meds authorizing provider and meeting all other requirements  Future Appointments  No visits were found meeting these conditions. Showing future appointments within next 150 days with a meds authorizing provider and meeting all other requirements     Requested Prescriptions   Pending Prescriptions Disp Refills    HYDROCHLOROTHIAZIDE 25 MG Oral Tab [Pharmacy Med Name: HYDROCHLOROTHIAZIDE 25 MG TAB] 90 tablet 1     Sig: TAKE 1 TABLET (25 MG TOTAL) BY MOUTH DAILY. Hypertensive Medications Protocol Failed - 3/3/2023 12:24 AM        Failed - Last BP reading less than 140/90     BP Readings from Last 1 Encounters:  12/09/22 : (!) 168/86              Failed - CMP or BMP in past 6 months     No results found for this or any previous visit (from the past 4392 hour(s)).             Failed - EGFRCR or GFRAA > 50     GFR Evaluation            Passed - In person appointment in the past 12 or next 3 months     Recent Outpatient Visits              4 months ago Type 2 diabetes mellitus without complication, without long-term current use of insulin [facility])    [facility], [place]    Office Visit    11 months ago Left hand pain    [facility] [place]    Office Visit    1 year ago Type 2 diabetes mellitus without complication, without long-term current use of insulin [facility])    Matt [place]vin [place], [facility] [place]    Office Visit    1 year ago Type 2 diabetes mellitus without complication, without long-term current use of insulin [facility])    [address], [place], [place]    Office Visit    2 years ago Type 2 diabetes mellitus without complication, without long-term current use of insulin [facility])    [address], [place] [facility],     Office Visit                      Passed - In person appointment or virtual visit in the past 6 months     Recent Outpatient Visits              4 months ago Type 2 diabetes mellitus without complication, without long-term current use of insulin [facility])    Matt [place] [place], [place], [place]    Office Visit    11 months ago Left hand pain    [facility], [address], [place], [place]    Office Visit    1 year ago Type 2 diabetes mellitus without complication, without long-term current use of insulin [facility])    [address], [place], [place]    Office Visit    1 year ago Type 2 diabetes mellitus without complication, without long-term current use of insulin [facility])    [address], [place], [place]    Office Visit    2 years ago Type 2 diabetes mellitus without complication, without long-term current use of insulin [facility])    [address], [place], [place]    Office Visit                             Recent Outpatient Visits              4 months ago Type 2 diabetes mellitus without complication, without long-term current use of insulin [facility])    [address] [facility] [facility] [place] [place]    Office Visit    11 months ago Left hand pain [id number] [facility], [address], [place]    Office Visit    1 year ago Type 2 diabetes mellitus without complication, without long-term current use of insulin [facility])    [address], [facility] [address], [place], [address] [place]    Office Visit    1 year ago Type 2 diabetes mellitus without complication, without long-term current use of insulin [facility])    [address], [facility] [address], [place], [address] [place], [place]    Office Visit    2 years ago Type 2 diabetes mellitus without complication, without long-term current use of insulin [facility])    [address], [facility] [facility] [address], [place], [address] [place]    Office Visit

## 2023-03-03 NOTE — TELEPHONE ENCOUNTER
Last labs June 2021. Dr. Ilan Calvo, please advise if you wish to order labs and advise on refill request.    Requested Prescriptions     Pending Prescriptions Disp Refills    ATORVASTATIN 20 MG Oral Tab [Pharmacy Med Name: ATORVASTATIN 20 MG TABLET] 90 tablet 1     Sig: TAKE 1 TABLET BY MOUTH EVERY DAY AT NIGHT      Recent Visits  Date Type Provider Dept   10/17/22 Office Visit Mike Funes, DO [facility]   03/29/22 Office Visit Mike Funes, DO [facility]   09/23/21 Office Visit Mike Funes, DO [facility]   Showing recent visits within past 540 days with a meds authorizing provider and meeting all other requirements  Future Appointments  No visits were found meeting these conditions.   Showing future appointments within next 150 days with a meds authorizing provider and meeting all other requirements     Requested Prescriptions   Pending Prescriptions Disp Refills    ATORVASTATIN 20 MG Oral Tab [Pharmacy Med Name: ATORVASTATIN 20 MG TABLET] 90 tablet 1     Sig: TAKE 1 TABLET BY MOUTH EVERY DAY AT NIGHT       Cholesterol Medication Protocol Failed - 3/3/2023 12:24 AM        Failed - ALT in past 12 months        Failed - LDL in past 12 months        Failed - Last ALT < 80     Lab Results   Component Value Date    ALT 14 06/24/2021             Failed - Last LDL < 130     Lab Results   Component Value Date     (H) 06/24/2021             Passed - In person appointment or virtual visit in the past 12 mos or appointment in next 3 mos     Recent Outpatient Visits              4 months ago Type 2 diabetes mellitus without complication, without long-term current use of insulin [facility])    [address], [address]    Office Visit    11 months ago Left hand pain    [facility], [address]    Office Visit    1 year ago Type 2 diabetes mellitus without complication, without long-term current use of insulin [facility])    [address], [place], [address], [facility]    Office Visit    1 year ago Type 2 diabetes mellitus without complication, without long-term current use of insulin [facility])    [address], [address], [place], [address], [facility]    Office Visit    2 years ago Type 2 diabetes mellitus without complication, without long-term current use of insulin [facility])    [address], [address], [place], [address], [facility]    Office Visit                             Recent Outpatient Visits              4 months ago Type 2 diabetes mellitus without complication, without long-term current use of insulin [facility])    [address], [address], [place], [address], [facility]    Office Visit    11 months ago Left hand pain    [facility], [address], [place], [address],     Office Visit    1 year ago Type 2 diabetes mellitus without complication, without long-term current use of insulin [facility])    [address], [address], [place], [address], [place]    Office Visit    1 year ago Type 2 diabetes mellitus without complication, without long-term current use of insulin [facility])    [address], [address], [place], [address], [place]    Office Visit    2 years ago Type 2 diabetes mellitus without complication, without long-term current use of insulin [facility])    [address], [address], [place], [address], [place]    Office Visit

## 2023-03-04 RX ORDER — HYDROCHLOROTHIAZIDE 25 MG/1
25 TABLET ORAL DAILY
Qty: 90 TABLET | Refills: 3 | Status: SHIPPED | OUTPATIENT
Start: 2023-03-04

## 2023-03-04 RX ORDER — ATORVASTATIN CALCIUM 20 MG/1
20 TABLET, FILM COATED ORAL NIGHTLY
Qty: 90 TABLET | Refills: 3 | Status: SHIPPED | OUTPATIENT
Start: 2023-03-04

## 2023-05-19 ENCOUNTER — TELEPHONE (OUTPATIENT)
Dept: FAMILY MEDICINE CLINIC | Facility: CLINIC | Age: 58
End: 2023-05-19

## 2023-05-19 ENCOUNTER — OFFICE VISIT (OUTPATIENT)
Dept: FAMILY MEDICINE CLINIC | Facility: CLINIC | Age: 58
End: 2023-05-19

## 2023-05-19 VITALS
BODY MASS INDEX: 34.57 KG/M2 | HEART RATE: 56 BPM | DIASTOLIC BLOOD PRESSURE: 74 MMHG | TEMPERATURE: 97 F | WEIGHT: 269.38 LBS | SYSTOLIC BLOOD PRESSURE: 150 MMHG | HEIGHT: 74 IN

## 2023-05-19 DIAGNOSIS — M65.9 TENOSYNOVITIS OF LEFT WRIST: ICD-10-CM

## 2023-05-19 DIAGNOSIS — Z28.21 PNEUMOCOCCAL VACCINATION DECLINED BY PATIENT: ICD-10-CM

## 2023-05-19 DIAGNOSIS — Z00.00 ROUTINE PHYSICAL EXAMINATION: Primary | ICD-10-CM

## 2023-05-19 DIAGNOSIS — E11.9 TYPE 2 DIABETES MELLITUS WITHOUT COMPLICATION, WITHOUT LONG-TERM CURRENT USE OF INSULIN (HCC): ICD-10-CM

## 2023-05-19 DIAGNOSIS — E78.5 HYPERLIPIDEMIA, UNSPECIFIED HYPERLIPIDEMIA TYPE: ICD-10-CM

## 2023-05-19 DIAGNOSIS — I10 ESSENTIAL HYPERTENSION: ICD-10-CM

## 2023-05-19 PROCEDURE — 99396 PREV VISIT EST AGE 40-64: CPT | Performed by: FAMILY MEDICINE

## 2023-05-19 PROCEDURE — 3008F BODY MASS INDEX DOCD: CPT | Performed by: FAMILY MEDICINE

## 2023-05-19 PROCEDURE — 3077F SYST BP >= 140 MM HG: CPT | Performed by: FAMILY MEDICINE

## 2023-05-19 PROCEDURE — 3078F DIAST BP <80 MM HG: CPT | Performed by: FAMILY MEDICINE

## 2023-05-19 RX ORDER — ENALAPRIL MALEATE 10 MG/1
10 TABLET ORAL DAILY
Qty: 90 TABLET | Refills: 0 | Status: SHIPPED | OUTPATIENT
Start: 2023-05-19 | End: 2023-08-17

## 2023-05-19 RX ORDER — CYCLOBENZAPRINE HCL 10 MG
10 TABLET ORAL 3 TIMES DAILY PRN
Qty: 20 TABLET | Refills: 0 | Status: SHIPPED | OUTPATIENT
Start: 2023-05-19

## 2023-05-19 RX ORDER — HYDROCHLOROTHIAZIDE 25 MG/1
25 TABLET ORAL DAILY
Qty: 90 TABLET | Refills: 3 | Status: SHIPPED | OUTPATIENT
Start: 2023-05-19

## 2023-05-19 RX ORDER — ATORVASTATIN CALCIUM 20 MG/1
20 TABLET, FILM COATED ORAL NIGHTLY
Qty: 90 TABLET | Refills: 3 | Status: SHIPPED | OUTPATIENT
Start: 2023-05-19

## 2023-05-19 RX ORDER — IBUPROFEN 800 MG/1
800 TABLET ORAL EVERY 8 HOURS PRN
Qty: 90 TABLET | Refills: 0 | Status: SHIPPED | OUTPATIENT
Start: 2023-05-19

## 2023-05-19 RX ORDER — ENALAPRIL MALEATE 5 MG/1
5 TABLET ORAL DAILY
Qty: 90 TABLET | Refills: 1 | Status: SHIPPED | OUTPATIENT
Start: 2023-05-19

## 2023-05-19 NOTE — TELEPHONE ENCOUNTER
Patient had an appointment with Dr. Sanford Quiñones, left [place] forms for re-certification. Forms emailed to [email or web address], original forms left in the [facility]. Patient would like to  the completed forms in the [facility].

## 2023-06-05 NOTE — TELEPHONE ENCOUNTER
Dr. Farhat Moreno     Please sign off on form if you agree to: Recert FMLA  5/03/65-3/12/28  (Please place your signature on the first page only)    -From your Inbasket, Highlight the patient and click Chart   -Double click the 2/12/90KKEIE Completion telephone encounter  -Scroll down to the Media section   -Click the blue Hyperlink: Darwyn Mcardle Dr. Wilhemina Carbine 1/9/07   -Click Acknowledge located at the bottom right corner (if you do not see acknowledge, try maximizing your window)   -Drag the mouse into the blank space of the document and a + sign will appear. Left click to   electronically sign the document.      Thank you,  Charles Saeed

## 2023-06-23 ENCOUNTER — TELEPHONE (OUTPATIENT)
Dept: FAMILY MEDICINE CLINIC | Facility: CLINIC | Age: 58
End: 2023-06-23

## 2023-07-19 DIAGNOSIS — E11.9 TYPE 2 DIABETES MELLITUS WITHOUT COMPLICATION, WITHOUT LONG-TERM CURRENT USE OF INSULIN (HCC): ICD-10-CM

## 2023-08-29 ENCOUNTER — OFFICE VISIT (OUTPATIENT)
Dept: FAMILY MEDICINE CLINIC | Facility: CLINIC | Age: 58
End: 2023-08-29

## 2023-08-29 ENCOUNTER — LAB ENCOUNTER (OUTPATIENT)
Dept: LAB | Age: 58
End: 2023-08-29
Attending: FAMILY MEDICINE
Payer: COMMERCIAL

## 2023-08-29 VITALS
DIASTOLIC BLOOD PRESSURE: 80 MMHG | HEIGHT: 74 IN | SYSTOLIC BLOOD PRESSURE: 150 MMHG | BODY MASS INDEX: 33.56 KG/M2 | TEMPERATURE: 98 F | HEART RATE: 78 BPM | WEIGHT: 261.5 LBS

## 2023-08-29 DIAGNOSIS — E78.5 HYPERLIPIDEMIA, UNSPECIFIED HYPERLIPIDEMIA TYPE: ICD-10-CM

## 2023-08-29 DIAGNOSIS — M79.642 LEFT HAND PAIN: ICD-10-CM

## 2023-08-29 DIAGNOSIS — E11.9 TYPE 2 DIABETES MELLITUS WITHOUT COMPLICATION, WITHOUT LONG-TERM CURRENT USE OF INSULIN (HCC): ICD-10-CM

## 2023-08-29 DIAGNOSIS — Z01.818 PRE-OP EVALUATION: Primary | ICD-10-CM

## 2023-08-29 DIAGNOSIS — I10 ESSENTIAL HYPERTENSION: ICD-10-CM

## 2023-08-29 DIAGNOSIS — Z01.812 ENCOUNTER FOR PRE-OPERATIVE LABORATORY TESTING: ICD-10-CM

## 2023-08-29 RX ORDER — ENALAPRIL MALEATE AND HYDROCHLOROTHIAZIDE 10; 25 MG/1; MG/1
1 TABLET ORAL DAILY
Qty: 90 TABLET | Refills: 1 | Status: SHIPPED | OUTPATIENT
Start: 2023-08-29

## 2023-08-30 LAB
ABSOLUTE BASOPHILS: 48 CELLS/UL (ref 0–200)
ABSOLUTE EOSINOPHILS: 88 CELLS/UL (ref 15–500)
ABSOLUTE LYMPHOCYTES: 2421 CELLS/UL (ref 850–3900)
ABSOLUTE MONOCYTES: 660 CELLS/UL (ref 200–950)
ABSOLUTE NEUTROPHILS: 3584 CELLS/UL (ref 1500–7800)
ALBUMIN/GLOBULIN RATIO: 1.4 (CALC) (ref 1–2.5)
ALBUMIN: 4 G/DL (ref 3.6–5.1)
ALKALINE PHOSPHATASE: 80 U/L (ref 35–144)
ALT: 12 U/L (ref 9–46)
AST: 13 U/L (ref 10–35)
BASOPHILS: 0.7 %
BILIRUBIN, TOTAL: 0.5 MG/DL (ref 0.2–1.2)
BUN: 15 MG/DL (ref 7–25)
CALCIUM: 9.5 MG/DL (ref 8.6–10.3)
CARBON DIOXIDE: 26 MMOL/L (ref 20–32)
CHLORIDE: 104 MMOL/L (ref 98–110)
CHOL/HDLC RATIO: 4.6 (CALC)
CHOLESTEROL, TOTAL: 246 MG/DL
CREATININE, RANDOM URINE: 147 MG/DL (ref 20–320)
CREATININE: 1.33 MG/DL (ref 0.7–1.35)
EGFR: 61 ML/MIN/1.73M2
EOSINOPHILS: 1.3 %
GLOBULIN: 2.8 G/DL (CALC) (ref 1.9–3.7)
GLUCOSE: 200 MG/DL (ref 65–99)
HDL CHOLESTEROL: 53 MG/DL
HEMATOCRIT: 40.2 % (ref 38.5–50)
HEMOGLOBIN A1C: 9.9 % OF TOTAL HGB
HEMOGLOBIN: 13.8 G/DL (ref 13.2–17.1)
LDL-CHOLESTEROL: 163 MG/DL (CALC)
LYMPHOCYTES: 35.6 %
MCH: 31.7 PG (ref 27–33)
MCHC: 34.3 G/DL (ref 32–36)
MCV: 92.2 FL (ref 80–100)
MICROALBUMIN/CREATININE RATIO, RANDOM URINE: 453 MCG/MG CREAT
MICROALBUMIN: 66.6 MG/DL
MONOCYTES: 9.7 %
MPV: 10.3 FL (ref 7.5–12.5)
NEUTROPHILS: 52.7 %
NON-HDL CHOLESTEROL: 193 MG/DL (CALC)
PLATELET COUNT: 255 THOUSAND/UL (ref 140–400)
POTASSIUM: 4.1 MMOL/L (ref 3.5–5.3)
PROTEIN, TOTAL: 6.8 G/DL (ref 6.1–8.1)
RDW: 12.1 % (ref 11–15)
RED BLOOD CELL COUNT: 4.36 MILLION/UL (ref 4.2–5.8)
SODIUM: 140 MMOL/L (ref 135–146)
TOTAL PSA: 1.4 NG/ML
TRIGLYCERIDES: 158 MG/DL
TSH W/REFLEX TO FT4: 2.26 MIU/L (ref 0.4–4.5)
WHITE BLOOD CELL COUNT: 6.8 THOUSAND/UL (ref 3.8–10.8)

## 2023-09-08 DIAGNOSIS — E11.9 TYPE 2 DIABETES MELLITUS WITHOUT COMPLICATION, WITHOUT LONG-TERM CURRENT USE OF INSULIN (HCC): Primary | ICD-10-CM

## 2023-09-08 DIAGNOSIS — E78.5 HYPERLIPIDEMIA, UNSPECIFIED HYPERLIPIDEMIA TYPE: ICD-10-CM

## 2023-11-20 DIAGNOSIS — I10 ESSENTIAL HYPERTENSION: ICD-10-CM

## 2023-11-20 DIAGNOSIS — E78.5 HYPERLIPIDEMIA, UNSPECIFIED HYPERLIPIDEMIA TYPE: ICD-10-CM

## 2023-11-20 DIAGNOSIS — E11.9 TYPE 2 DIABETES MELLITUS WITHOUT COMPLICATION, WITHOUT LONG-TERM CURRENT USE OF INSULIN (HCC): ICD-10-CM

## 2023-11-20 RX ORDER — ATORVASTATIN CALCIUM 20 MG/1
20 TABLET, FILM COATED ORAL NIGHTLY
Qty: 30 TABLET | Refills: 2 | Status: CANCELLED | OUTPATIENT
Start: 2023-11-20

## 2023-11-20 RX ORDER — ENALAPRIL MALEATE AND HYDROCHLOROTHIAZIDE 10; 25 MG/1; MG/1
1 TABLET ORAL DAILY
Qty: 90 TABLET | Refills: 1 | Status: CANCELLED | OUTPATIENT
Start: 2023-11-20

## 2023-11-20 NOTE — TELEPHONE ENCOUNTER
Reviewed 23 office note and  result note. Called patient, confirmed name and . Patient states he has been better about taking metformin but that he still doesn't remember everyday. Patient states he remembers 5 of 7 days. Patient encouraged to take metformin as prescribed and retest A1C in March as discussed on . Discussed ways of helping to remember to take medication. Patient verbalized understanding. Asked patient if he would like referral to diabetic navigator or endo. Patient states he is still thinking about that. Patient states he needs refills of enalapril-hydrochlorothiazide, metformin and atorvastatin. Medications pended. Routing through protocol.

## 2023-11-20 NOTE — TELEPHONE ENCOUNTER
Patient says the doctor mentioned at his last visit back on 8/29/23 that he could increase his diabetes medication:    metFORMIN 500 MG Oral Tab     Patient wants to know if he should have it increased. Has not had any recent symptoms or changes in how he feels.

## 2023-11-20 NOTE — TELEPHONE ENCOUNTER
I called and left message for patient to call us back. Would like to inform that he should have refills available at the pharmacy for Enalapril hydrochlorothiazide 10/25 - Is this what he has been taking? (Prior RX for plain enalapril on file for the patient)     Also, would like to confirm what dose of Metformin patient is taking? 500mg tab or 1000mg tab? He would also have refills available of atorvastatin at the pharmacy available to fill. Per 8/29/23 visit   3. Essential hypertension  Medication reviewed and renewed and compliance emphasized and encouraged. - enalapril-hydroCHLOROthiazide 10-25 MG Oral Tab; Take 1 tablet by mouth daily. Dispense: 90 tablet;  Refill: 1       Most recent RX filled for Metformin was 500mg

## 2023-11-21 NOTE — TELEPHONE ENCOUNTER
Please review; protocol failed/ no protocol  Medication pended for your review and approval.     Requested Prescriptions   Pending Prescriptions Disp Refills    metFORMIN 500 MG Oral Tab 180 tablet 0     Sig: Take 1 tablet (500 mg total) by mouth 2 (two) times daily with meals.        Diabetes Medication Protocol Failed - 11/21/2023 10:08 AM        Failed - Last A1C < 7.5 and within past 6 months     Lab Results   Component Value Date    A1C 9.9 (H) 08/29/2023             Passed - In person appointment or virtual visit in the past 6 mos or appointment in next 3 mos     Recent Outpatient Visits              2 months ago Pre-op evaluation    [facility] [place] [place]    Office Visit    6 months ago Routine physical examination    [facility] [address] [place], [place] [address]    Office Visit    1 year ago Type 2 diabetes mellitus without complication, without long-term current use of insulin [facility])    [employer] [place]oca,     Office Visit    1 year ago Left hand pain    [facility]    Office Visit    2 years ago Type 2 diabetes mellitus without complication, without long-term current use of insulin [facility])    [employer] [address] [place], [place] [address], [place]    Office Visit          Future Appointments         Provider Department Appt Notes    In 2 months Nina López DO [facility], [place] [address], [facility] follow up on hand policy informed to pt               Passed - EGFRCR or GFRAA > 50     GFR Evaluation  EGFRCR: 61 , resulted on 8/29/2023          Passed - GFR in the past 12 months

## 2023-11-21 NOTE — TELEPHONE ENCOUNTER
Informed patient there are refills on file at the pharmacy for Enalarpril-hydrochlorothiazide and Atorvastatin. Patient verified that he is taking Metformin 500 mg tabs.

## 2023-11-23 DIAGNOSIS — I10 ESSENTIAL HYPERTENSION: ICD-10-CM

## 2023-11-23 DIAGNOSIS — M65.9 TENOSYNOVITIS OF LEFT WRIST: ICD-10-CM

## 2023-11-24 RX ORDER — ENALAPRIL MALEATE 10 MG/1
10 TABLET ORAL DAILY
Qty: 90 TABLET | Refills: 0 | Status: SHIPPED | OUTPATIENT
Start: 2023-11-24

## 2023-11-24 RX ORDER — IBUPROFEN 800 MG/1
800 TABLET ORAL EVERY 8 HOURS PRN
Qty: 90 TABLET | Refills: 0 | Status: SHIPPED | OUTPATIENT
Start: 2023-11-24

## 2023-11-24 NOTE — TELEPHONE ENCOUNTER
Please review; protocol failed. Protocol passed for ibuprofen 800 but wanted to clarify if you still wanted pt takng this?   Last BP was 150/80 on 08/29/23  Hypertensive Medications  Protocol Criteria:  Appointment scheduled in the past 6 months or in the next 3 months  BMP or CMP in the past 12 months  Creatinine result < 2  Recent Outpatient Visits              2 months ago Pre-op evaluation    [facility] [place], [place]    Office Visit    6 months ago Routine physical examination    [facility] [place], [place]    Office Visit    1 year ago Type 2 diabetes mellitus without complication, without long-term current use of insulin [facility])    [address] [place] [address], [place], [place]    Office Visit    1 year ago Left hand pain    [facility], [place] [address], [place], [place]    Office Visit    2 years ago Type 2 diabetes mellitus without complication, without long-term current use of insulin [facility])    [address] [place] [address], [place], [place]    Office Visit          Future Appointments         Provider Department Appt Notes    In 2 months Marry Ramirez DO [address] [facility] follow up on hand policy informed to pt          Lab Results   Component Value Date     (H) 08/29/2023    BUN 15 08/29/2023    CREATSERUM 1.33 08/29/2023    BUNCREA SEE NOTE: 08/29/2023    GFRNAA 60 06/24/2021    GFRAA 70 06/24/2021    CA 9.5 08/29/2023    AST 13 08/29/2023    ALT 12 08/29/2023    BILT 0.5 08/29/2023    TP 6.8 08/29/2023    ALB 4.0 08/29/2023     08/29/2023    K 4.1 08/29/2023     08/29/2023    CO2 26 08/29/2023    GLOBULIN 2.8 08/29/2023    AGRATIO 1.4 08/29/2023       Refill Protocol Appointment Criteria  Appointment scheduled in the past 6 months or in the next 3 months  Recent Outpatient Visits              2 months ago Pre-op evaluation    [facility], [place]    Office Visit    6 months ago Routine physical examination    [facility], [facility] [place], [place]    Office Visit    1 year ago Type 2 diabetes mellitus without complication, without long-term current use of insulin [facility])    [facility] [place]    Office Visit    1 year ago Left hand pain    [address] [place] [address] Abilio [place]     Office Visit    2 years ago Type 2 diabetes mellitus without complication, without long-term current use of insulin [facility])    [facility]     Office Visit          Future Appointments         Provider Department Appt Notes    In 2 months Jena Johnson DO [facility] follow up on hand policy informed to pt

## 2023-12-17 DIAGNOSIS — M65.9 TENOSYNOVITIS OF LEFT WRIST: ICD-10-CM

## 2023-12-18 RX ORDER — IBUPROFEN 800 MG/1
800 TABLET ORAL EVERY 8 HOURS PRN
Qty: 90 TABLET | Refills: 0 | Status: SHIPPED | OUTPATIENT
Start: 2023-12-18

## 2023-12-18 NOTE — TELEPHONE ENCOUNTER
Refill passed per [facility] protocol.   Requested Prescriptions   Pending Prescriptions Disp Refills    IBUPROFEN 800 MG Oral Tab [Pharmacy Med Name: IBUPROFEN 800 MG TABLET] 90 tablet 0     Sig: TAKE 1 TABLET BY MOUTH EVERY 8 HOURS AS NEEDED FOR PAIN       Non-Narcotic Pain Medication Protocol Passed - 12/17/2023  7:41 AM        Passed - In person appointment or virtual visit in the past 6 mos or appointment in next 3 mos     Recent Outpatient Visits              3 months ago Pre-op evaluation    [facility] [place], [place]    Office Visit    7 months ago Routine physical examination    [address] [place], [place]    Office Visit    1 year ago Type 2 diabetes mellitus without complication, without long-term current use of insulin [facility])    [address], [address], [facility]    Office Visit    1 year ago Left hand pain    [facility], [address], [facility]    Office Visit    2 years ago Type 2 diabetes mellitus without complication, without long-term current use of insulin [facility])    [address], [address], [place], [place]    Office Visit          Future Appointments         Provider Department Appt Notes    In 1 month Rafael Rivera DO [address], [address], [facility] follow up on hand policy informed to pt                  Recent Outpatient Visits              3 months ago Pre-op evaluation    [address] [place], [place]    Office Visit    7 months ago Routine physical examination    [address], [address], [place], [place]    Office Visit    1 year ago Type 2 diabetes mellitus without complication, without long-term current use of insulin ([facility] [address]    [address] [facility], [place], Emely Rodgers DO    Office Visit    1 year ago Left hand pain    [facility] [address], [place], [facility] [place], [place]    Office Visit    2 years ago Type 2 diabetes mellitus without complication, without long-term current use of insulin [facility])    [address], [facility] [place], [facility] [place], [place]    Office Visit          Future Appointments         Provider Department Appt Notes    In 1 month [place] March, DO [address] [facility] follow up on hand policy informed to pt

## 2024-03-11 DIAGNOSIS — I10 ESSENTIAL HYPERTENSION: ICD-10-CM

## 2024-03-11 RX ORDER — ENALAPRIL MALEATE 5 MG/1
5 TABLET ORAL DAILY
Qty: 90 TABLET | Refills: 1 | OUTPATIENT
Start: 2024-03-11

## 2024-03-11 NOTE — TELEPHONE ENCOUNTER
Please call and see what patient is actually taking?  Looks like he is picking up the combination plus the individual medications.  I attached what Dr. Allen wants him to take from office visit 08/29/2023.          Office visit note from 08/29/2023:  Patient to complete taking the enalapril 5 mg, but he is to increase it to the equivalent of 10 mg orally daily. In other words he will take 2 tablets at 1 time once daily until he is finished with that prescription. A prescription for enalapril/HCTZ at 10/25 mg sent to the pharmacy to replace the enalapril 5 mg.

## 2024-03-12 RX ORDER — ENALAPRIL MALEATE AND HYDROCHLOROTHIAZIDE 10; 25 MG/1; MG/1
1 TABLET ORAL DAILY
Qty: 90 TABLET | Refills: 1 | Status: SHIPPED | OUTPATIENT
Start: 2024-03-12

## 2024-03-12 NOTE — TELEPHONE ENCOUNTER
Spoke to pt, stated he was taking both the 5 mg  and 10 mg, enalapril, relayed Dr visit message, should just be on the 1o mg , pt do not monitor his BP  Please review.Protocol failed/ No protocol      Requested Prescriptions   Pending Prescriptions Disp Refills    ENALAPRIL-HYDROCHLOROTHIAZIDE 10-25 MG Oral Tab [Pharmacy Med Name: ENALAPRIL-HCTZ 10-25 MG TABLET] 90 tablet 1     Sig: TAKE 1 TABLET BY MOUTH EVERY DAY       Hypertension Medications Protocol Failed - 3/11/2024  4:57 PM        Failed - Last BP reading less than 140/90     BP Readings from Last 1 Encounters:   08/29/23 150/80               Passed - CMP or BMP in past 12 months        Passed - In person appointment or virtual visit in the past 12 mos or appointment in next 3 mos     Recent Outpatient Visits              6 months ago Pre-op evaluation    [facility] Derik Allen, DO    Office Visit    9 months ago Routine physical examination    [facility] Derik Allen, DO    Office Visit    1 year ago Type 2 diabetes mellitus without complication, without long-term current use of insulin ([facility])    [facility] Derik Allen, DO    Office Visit    1 year ago Left hand pain    [facility] Derik Allen, DO    Office Visit    2 years ago Type 2 diabetes mellitus without complication, without long-term current use of insulin ([facility])    [facility] Derik Allen, DO    Office Visit          Future Appointments         Provider Department Appt Notes    In 3 weeks Derik Allen DO [facility] follow up on hand policy informed to pt               Passed - EGFRCR or GFRAA > 50     GFR Evaluation  EGFRCR: 61 , resulted on 8/29/2023           Refused Prescriptions Disp Refills    ENALAPRIL  5 MG Oral Tab [Pharmacy Med Name: ENALAPRIL MALEATE 5 MG TABLET] 90 tablet 1     Sig: TAKE 1 TABLET (5 MG TOTAL) BY MOUTH DAILY.       Hypertension Medications Protocol Failed - 3/11/2024  4:57 PM        Failed - Last BP reading less than 140/90     BP Readings from Last 1 Encounters:   08/29/23 150/80               Passed - CMP or BMP in past 12 months        Passed - In person appointment or virtual visit in the past 12 mos or appointment in next 3 mos     Recent Outpatient Visits              6 months ago Pre-op evaluation    [facility] Derik Allen, DO    Office Visit    9 months ago Routine physical examination    [facility] Derik Allen, DO    Office Visit    1 year ago Type 2 diabetes mellitus without complication, without long-term current use of insulin ([facility])    [facility] Derik Allen, DO    Office Visit    1 year ago Left hand pain    [facility] Derik Allen, DO    Office Visit    2 years ago Type 2 diabetes mellitus without complication, without long-term current use of insulin ([facility])    [facility] Derik Allen, DO    Office Visit          Future Appointments         Provider Department Appt Notes    In 3 weeks Derik Allen DO [facility] follow up on hand policy informed to pt               Passed - EGFRCR or GFRAA > 50     GFR Evaluation  EGFRCR: 61 , resulted on 8/29/2023               Future Appointments         Provider Department Appt Notes    In 3 weeks Derik Allen DO [facility] follow up on hand policy informed to pt             Recent Outpatient Visits              6 months ago Pre-op evaluation    [facility]  Derik Allen, DO    Office Visit    9 months ago Routine physical examination    [facility], [facility] Derik Allen, DO    Office Visit    1 year ago Type 2 diabetes mellitus without complication, without long-term current use of insulin ([facility])    [facility], [facility] Derik Allen, DO    Office Visit    1 year ago Left hand pain    [facility] Derik Allen, DO    Office Visit    2 years ago Type 2 diabetes mellitus without complication, without long-term current use of insulin ([facility])    [facility], [facility] Derik Allen, DO    Office Visit

## 2024-04-05 ENCOUNTER — OFFICE VISIT (OUTPATIENT)
Dept: FAMILY MEDICINE CLINIC | Facility: CLINIC | Age: 59
End: 2024-04-05

## 2024-04-05 VITALS
TEMPERATURE: 98 F | OXYGEN SATURATION: 98 % | WEIGHT: 263 LBS | RESPIRATION RATE: 18 BRPM | HEART RATE: 66 BPM | DIASTOLIC BLOOD PRESSURE: 82 MMHG | SYSTOLIC BLOOD PRESSURE: 145 MMHG | BODY MASS INDEX: 34 KG/M2

## 2024-04-05 DIAGNOSIS — I10 ESSENTIAL HYPERTENSION: ICD-10-CM

## 2024-04-05 DIAGNOSIS — M79.642 LEFT HAND PAIN: ICD-10-CM

## 2024-04-05 DIAGNOSIS — E11.9 TYPE 2 DIABETES MELLITUS WITHOUT COMPLICATION, WITHOUT LONG-TERM CURRENT USE OF INSULIN (HCC): Primary | ICD-10-CM

## 2024-04-05 LAB — HEMOGLOBIN A1C: 10.2 % (ref 4.3–5.6)

## 2024-04-05 PROCEDURE — 99214 OFFICE O/P EST MOD 30 MIN: CPT | Performed by: FAMILY MEDICINE

## 2024-04-05 PROCEDURE — 83036 HEMOGLOBIN GLYCOSYLATED A1C: CPT | Performed by: FAMILY MEDICINE

## 2024-04-05 NOTE — PATIENT INSTRUCTIONS
To ortho/hand regarding left hand pain.  Patient given recommendation to start Januvia 50 mg along with glipizide 10 mg and metformin.  FMLA privileges to remain in unchanged from previous recommendation/allotment.   Enalapril/HCTZ 10/25 mg to be taken orally daily is the blood pressure medication that the patient should be taking.  If the patient still has enalapril 5 mg tablet, this is to be discontinued.  Patient to check his prescriptions and will call and let us know which blood pressure medication he has at home.

## 2024-04-06 NOTE — PROGRESS NOTES
Subjective:     Patient ID: Shyam Gaytan is a 59 year old male.    This patient is a 59-year-old obese by definition/hypertensive/poorly controlled diabetic who presents to the clinic regarding the continuation of left hand discomfort and needs his FMLA updated.  Patient denies headaches, chest pain, dizziness, shortness of breath, visual changes, exertional fatigue, and/or exertional fatigue.    Today's A1c unfortunately is greater than 10.  Patient is not on compliant with regards to dietary disciplines.  It has been emphasized to him once again that this needs to improve his hypertension and diabetes can be brutally silent and there assault to the body.  Today we are adding Januvia to his diabetic treatment.    His antihypertensive has also been reviewed and the patient has promised to get back to me regarding whether he is taking enalapril 5 mg versus enalapril/HCTZ 10/25 mg orally daily.    Regarding his hand pathology and persistent symptoms, his FMLA is to remain the same as it has been allotted.    There is a referral that has been refreshed on the chart for him to see our hand specialist as well.  Thank you.        History/Other:   Review of Systems  Current Outpatient Medications   Medication Sig Dispense Refill    SITagliptin Phosphate (JANUVIA) 50 MG Oral Tab Take 1 tablet (50 mg total) by mouth daily. 90 tablet 0    cyclobenzaprine 10 MG Oral Tab Take 1 tablet (10 mg total) by mouth 3 (three) times daily as needed for Muscle spasms. 20 tablet 0    hydroCHLOROthiazide 25 MG Oral Tab Take 1 tablet (25 mg total) by mouth daily. 90 tablet 3    enalapril-hydroCHLOROthiazide 10-25 MG Oral Tab Take 1 tablet by mouth daily. 90 tablet 1    ibuprofen 800 MG Oral Tab Take 1 tablet (800 mg total) by mouth every 8 (eight) hours as needed for Pain. (Patient not taking: Reported on 4/5/2024) 90 tablet 0    enalapril 10 MG Oral Tab Take 1 tablet (10 mg total) by mouth daily. 90 tablet 0    metFORMIN 500 MG Oral Tab  Take 1 tablet (500 mg total) by mouth 2 (two) times daily with meals. 180 tablet 3    atorvastatin 20 MG Oral Tab Take 1 tablet (20 mg total) by mouth nightly. 90 tablet 3    naproxen 500 MG Oral Tab Take 1 tablet (500 mg total) by mouth 2 (two) times daily as needed. (Patient not taking: Reported on 5/19/2023) 20 tablet 0    lidocaine (HM LIDOCAINE PATCH) 4 % External Patch Place 1 patch onto the skin daily. 10 patch 0    GLIPIZIDE 10 MG Oral Tab TAKE 1 TABLET BY MOUTH TWICE A DAY BEFORE MEALS 180 tablet 0    metFORMIN HCl 1000 MG Oral Tab Take 1 tablet (1,000 mg total) by mouth 2 (two) times daily with meals. (Patient not taking: Reported on 5/19/2023) 180 tablet 1    atorvastatin 20 MG Oral Tab Take 1 tablet (20 mg total) by mouth nightly. (Patient not taking: Reported on 3/29/2022) 30 tablet 2    SILDENAFIL CITRATE 100 MG Oral Tab TAKE 1 TABLET BY MOUTH EVERY DAY AS NEEDED (Patient not taking: Reported on 3/29/2022) 2 tablet 3    Continuous Blood Gluc Sensor ([employer] JULIETTE 14 DAY SENSOR) Does not apply Misc 1 each by Does not apply route every 14 (fourteen) days. (Patient not taking: Reported on 3/29/2022) 6 each 0    Tadalafil (CIALIS) 20 MG Oral Tab Take 1 tablet (20 mg total) by mouth daily as needed for Erectile Dysfunction. (Patient not taking: Reported on 3/29/2022) 8 tablet 2    Glucose Blood (ONETOUCH VERIO) In Vitro Strip Check 1-2 times a day. (Patient not taking: Reported on 3/29/2022) 50 each 0    ONETOUCH DELICA LANCETS 33G Does not apply Misc Check once a day (Patient not taking: Reported on 3/29/2022) 100 each 0    ibuprofen 200 MG Oral Tab Take 200 mg by mouth every 6 (six) hours as needed for Pain. (Patient not taking: Reported on 3/8/2023)      Albuterol Sulfate HFA (VENTOLIN) 108 (90 BASE) MCG/ACT Inhalation Aero Soln Inhale 2 puffs into the lungs every 6 (six) hours as needed for Wheezing. (Patient not taking: Reported on 7/15/2020) 2 Inhaler 2     Allergies:  Allergies   Allergen Reactions     Penicillins HIVES       Past Medical History:   Diagnosis Date    Diabetes (HCC)       Past Surgical History:   Procedure Laterality Date    ELECTROCARDIOGRAM, COMPLETE  10/8/2013    scanned to media tab      Family History   Problem Relation Age of Onset    Diabetes Mother       Social History:   Social History     Socioeconomic History    Marital status:    Tobacco Use    Smoking status: Never    Smokeless tobacco: Never    Tobacco comments:     occassional cigar   Vaping Use    Vaping Use: Never used   Substance and Sexual Activity    Alcohol use: Yes     Alcohol/week: 7.0 - 8.0 standard drinks of alcohol     Types: 2 Cans of beer, 5 - 6 Shots of liquor per week    Drug use: No   Other Topics Concern    Caffeine Concern No        Objective:   Vitals:    04/05/24 1056   BP: 145/82   Pulse:    Resp:    Temp:        Physical Exam  Constitutional:       Appearance: He is obese.   HENT:      Head: Normocephalic and atraumatic.   Cardiovascular:      Rate and Rhythm: Normal rate and regular rhythm.      Heart sounds:      No gallop.   Pulmonary:      Effort: Pulmonary effort is normal. No respiratory distress.      Breath sounds: Normal breath sounds.   Musculoskeletal:      Left hand: Tenderness present.   Neurological:      Mental Status: He is alert.         Assessment & Plan:   1. Type 2 diabetes mellitus without complication, without long-term current use of insulin ([facility])  The following medication has been added to the diabetic treatment regimen.  - POC Glycohemoglobin [84038]; 10.3 on today.  - SITagliptin Phosphate (JANUVIA) 50 MG Oral Tab; Take 1 tablet (50 mg total) by mouth daily.  Dispense: 90 tablet; Refill: 0    2. Left hand pain  Referred.  - Plastic Surgery Referral - In Network    3. Essential hypertension  By criteria not to goal as this patient is above 60 years old, but he is a diabetic.  It has been reiterated to the patient that the goal is for the patient's blood pressure to be less than  140/90 with consistency.      Orders Placed This Encounter   Procedures    POC Glycohemoglobin [45652]       Meds This Visit:  Requested Prescriptions     Signed Prescriptions Disp Refills    SITagliptin Phosphate (JANUVIA) 50 MG Oral Tab 90 tablet 0     Sig: Take 1 tablet (50 mg total) by mouth daily.       Imaging & Referrals:  PLASTIC SURGERY - INTERNAL     Patient Instructions   To ortho/hand regarding left hand pain.  Patient given recommendation to start Januvia 50 mg along with glipizide 10 mg and metformin.  FMLA privileges to remain in unchanged from previous recommendation/allotment.   Enalapril/HCTZ 10/25 mg to be taken orally daily is the blood pressure medication that the patient should be taking.  If the patient still has enalapril 5 mg tablet, this is to be discontinued.  Patient to check his prescriptions and will call and let us know which blood pressure medication he has at home.    Return in about 3 months (around 7/5/2024), or if symptoms worsen or fail to improve.

## 2024-04-08 ENCOUNTER — TELEPHONE (OUTPATIENT)
Dept: FAMILY MEDICINE CLINIC | Facility: CLINIC | Age: 59
End: 2024-04-08

## 2024-04-08 NOTE — TELEPHONE ENCOUNTER
Januvia prior auth completed    Approved    Prior authorization approved Case ID: [id number]      Payer: [employer]    [phone]    [phone]   CaseId:[id number];Status:Approved;Review Type:Prior Auth;Coverage Start Date:04/08/2024;Coverage End Date:12/31/2099;   Approval Details    Authorized from April 8, 2024 to December 31, 2099      Electronic appeal: Not supported   View History

## 2024-05-20 ENCOUNTER — TELEPHONE (OUTPATIENT)
Dept: FAMILY MEDICINE CLINIC | Facility: CLINIC | Age: 59
End: 2024-05-20

## 2024-05-20 NOTE — TELEPHONE ENCOUNTER
Dr. Allen, please see message below. Rn did pend medications separately if you would like to go that route.

## 2024-05-20 NOTE — TELEPHONE ENCOUNTER
enalapril-hydroCHLOROthiazide 10-25 MG Oral Tab, Take 1 tablet by mouth daily., Disp: 90 tablet, Rfl: 1          Message: backordered/ unavailable: combo has been on backordered for a couple months. Please send alternative

## 2024-05-21 ENCOUNTER — TELEPHONE (OUTPATIENT)
Dept: FAMILY MEDICINE CLINIC | Facility: CLINIC | Age: 59
End: 2024-05-21

## 2024-05-21 DIAGNOSIS — E11.9 TYPE 2 DIABETES MELLITUS WITHOUT COMPLICATION, WITHOUT LONG-TERM CURRENT USE OF INSULIN (HCC): ICD-10-CM

## 2024-05-21 RX ORDER — ENALAPRIL MALEATE 10 MG/1
10 TABLET ORAL DAILY
Qty: 90 TABLET | Refills: 1 | Status: SHIPPED | OUTPATIENT
Start: 2024-05-21

## 2024-05-21 RX ORDER — HYDROCHLOROTHIAZIDE 25 MG/1
25 TABLET ORAL DAILY
Qty: 90 TABLET | Refills: 1 | Status: SHIPPED | OUTPATIENT
Start: 2024-05-21

## 2024-05-21 NOTE — TELEPHONE ENCOUNTER
Left detailed message for patient (per HUGH) informing [facility] not [facility] available so Dr. Allen has sent two separate orders, enalapril 10 MG and hydrochlorothiazide  [facility]. Left call back number if patient has any questions.

## 2024-05-21 NOTE — TELEPHONE ENCOUNTER
Patient calling- stated he was suppose to get another medication, asked what is it for , Patient did not know and stated to just forget it, advised - since he made the the call , this need to be Clarified       Stated he believes it for his diabetes, Medlist shows -  rx januvia was sent 4/5/24- pt stated he was not on the medication and metformin was stopped     Spoke to Pharmacist , Viri stated rx januvia 50 mg was sent 4/8/24 and put on hold 5/19, waiting on authorization from the      Medication re pended

## 2024-05-21 NOTE — TELEPHONE ENCOUNTER
Medications have been signed and sent to the pharmacy.  Please call the patient and let him know.

## 2024-05-22 NOTE — TELEPHONE ENCOUNTER
Medication has been signed and sent to the pharmacy.  Please let all pertinent parties know.  Thank you.

## 2024-05-23 NOTE — TELEPHONE ENCOUNTER
Per HUGH, patient was informed via voice mail of MD recommendation and to call back with any question.

## 2024-06-10 ENCOUNTER — TELEPHONE (OUTPATIENT)
Dept: FAMILY MEDICINE CLINIC | Facility: CLINIC | Age: 59
End: 2024-06-10

## 2024-06-10 DIAGNOSIS — M65.9 TENOSYNOVITIS OF LEFT WRIST: ICD-10-CM

## 2024-06-10 NOTE — TELEPHONE ENCOUNTER
Patient called requesting a refill on his pain medication for his hands. Per patient he is completely out of his medication.

## 2024-06-10 NOTE — TELEPHONE ENCOUNTER
What medication?  Ibuprofen, naproxen are both marked as Patient not Taking    No recent pain medications on dispense history

## 2024-06-10 NOTE — TELEPHONE ENCOUNTER
RN called patient (name and date of birth verified) and he stated that he is currently taking ibuprofen 800 MG one tablet every 8 hours as needed for pain.     Medication pended if appropriate.     Last office visit 4/05/2024.

## 2024-06-11 RX ORDER — IBUPROFEN 800 MG/1
800 TABLET ORAL EVERY 8 HOURS PRN
Qty: 90 TABLET | Refills: 0 | Status: SHIPPED | OUTPATIENT
Start: 2024-06-11

## 2024-06-11 NOTE — TELEPHONE ENCOUNTER
Left a detailed message to cell (ok per Release Of information) regarding note below. Advised to call back as needed.

## 2024-06-11 NOTE — TELEPHONE ENCOUNTER
Prescription has been approved, signed, and sent to the pharmacy.  Please inquire with the patient how often he is taking his ibuprofen.  The concern is he has diabetes and he is on anti-inflammatory and this can affect his kidney function.  As it stands his kidney function from August 2023 labs is good, however he is going to have to come in and update his kidney function status in order for us to continue to give him this medication.  This will be the last refill for this medication until he comes in for updated labs.  Thank you.

## 2024-07-09 ENCOUNTER — OFFICE VISIT (OUTPATIENT)
Dept: FAMILY MEDICINE CLINIC | Facility: CLINIC | Age: 59
End: 2024-07-09

## 2024-07-09 ENCOUNTER — TELEPHONE (OUTPATIENT)
Dept: FAMILY MEDICINE CLINIC | Facility: CLINIC | Age: 59
End: 2024-07-09

## 2024-07-09 VITALS
DIASTOLIC BLOOD PRESSURE: 80 MMHG | HEART RATE: 61 BPM | BODY MASS INDEX: 34 KG/M2 | SYSTOLIC BLOOD PRESSURE: 160 MMHG | WEIGHT: 265 LBS

## 2024-07-09 DIAGNOSIS — M79.642 LEFT HAND PAIN: Primary | ICD-10-CM

## 2024-07-09 DIAGNOSIS — Z28.21 VARICELLA ZOSTER VIRUS (VZV) VACCINATION DECLINED: ICD-10-CM

## 2024-07-09 DIAGNOSIS — I10 ESSENTIAL HYPERTENSION: ICD-10-CM

## 2024-07-09 DIAGNOSIS — Z28.21 PNEUMOCOCCAL VACCINATION DECLINED BY PATIENT: ICD-10-CM

## 2024-07-09 DIAGNOSIS — E11.9 TYPE 2 DIABETES MELLITUS WITHOUT COMPLICATION, WITHOUT LONG-TERM CURRENT USE OF INSULIN (HCC): ICD-10-CM

## 2024-07-09 LAB — HEMOGLOBIN A1C: 8.4 % (ref 4.3–5.6)

## 2024-07-09 PROCEDURE — 83036 HEMOGLOBIN GLYCOSYLATED A1C: CPT | Performed by: FAMILY MEDICINE

## 2024-07-09 PROCEDURE — 99214 OFFICE O/P EST MOD 30 MIN: CPT | Performed by: FAMILY MEDICINE

## 2024-07-09 RX ORDER — AMLODIPINE BESYLATE 5 MG/1
5 TABLET ORAL DAILY
Qty: 90 TABLET | Refills: 1 | Status: SHIPPED | OUTPATIENT
Start: 2024-07-09

## 2024-07-09 NOTE — PROGRESS NOTES
Subjective:     Patient ID: Shyam Gaytan is a 59 year old male.    This patient is a 59-year-old hypertensive/diabetic/needle phobic -American gentleman who has had chronic left hand and wrist problems which have exacerbations in which he is not able to perform the tasks associated with his job.  He has had 2 take more time off and needs an FMLA adjustment to be made.      We will call over 3 episodes per 30 days and each episode constitutes 2 days.  This allows for the patient to be off without penalty at least 6 days out of a month and this can be utilized intermittently or consecutively effective immediately on Tuesday, July 9, 2024 through until July 9, 2025.    Regarding hypertension and diabetes, the patient denies headaches, chest pain, dizziness, shortness of breath, visual changes, exertional fatigue, urinary frequency, excessive hunger and/or thirst.    Patient claims compliance with medication.    His A1c has improved and dropped by greater than 1 point since our last assessment.    Patient declines on recommended shingles and pneumococcal vaccine secondary to his needle phobia.    Patient due for diabetic foot exam on today which was done.        History/Other:   Review of Systems  Current Outpatient Medications   Medication Sig Dispense Refill    ibuprofen 800 MG Oral Tab Take 1 tablet (800 mg total) by mouth every 8 (eight) hours as needed for Pain. 90 tablet 0    enalapril 10 MG Oral Tab Take 1 tablet (10 mg total) by mouth daily. 90 tablet 1    enalapril 10 MG Oral Tab Take 1 tablet (10 mg total) by mouth daily. 90 tablet 0    cyclobenzaprine 10 MG Oral Tab Take 1 tablet (10 mg total) by mouth 3 (three) times daily as needed for Muscle spasms. 20 tablet 0    GLIPIZIDE 10 MG Oral Tab TAKE 1 TABLET BY MOUTH TWICE A DAY BEFORE MEALS 180 tablet 0    atorvastatin 20 MG Oral Tab Take 1 tablet (20 mg total) by mouth nightly. 30 tablet 2    SITagliptin Phosphate (JANUVIA) 50 MG Oral Tab Take 1  tablet (50 mg total) by mouth daily. 90 tablet 0    hydroCHLOROthiazide 25 MG Oral Tab Take 1 tablet (25 mg total) by mouth daily. 90 tablet 1    enalapril-hydroCHLOROthiazide 10-25 MG Oral Tab Take 1 tablet by mouth daily. 90 tablet 1    metFORMIN 500 MG Oral Tab Take 1 tablet (500 mg total) by mouth 2 (two) times daily with meals. 180 tablet 3    hydroCHLOROthiazide 25 MG Oral Tab Take 1 tablet (25 mg total) by mouth daily. 90 tablet 3    naproxen 500 MG Oral Tab Take 1 tablet (500 mg total) by mouth 2 (two) times daily as needed. (Patient not taking: Reported on 5/19/2023) 20 tablet 0    lidocaine (HM LIDOCAINE PATCH) 4 % External Patch Place 1 patch onto the skin daily. 10 patch 0    metFORMIN HCl 1000 MG Oral Tab Take 1 tablet (1,000 mg total) by mouth 2 (two) times daily with meals. (Patient not taking: Reported on 5/19/2023) 180 tablet 1    SILDENAFIL CITRATE 100 MG Oral Tab TAKE 1 TABLET BY MOUTH EVERY DAY AS NEEDED (Patient not taking: Reported on 3/29/2022) 2 tablet 3    Continuous Blood Gluc Sensor ([employer] JULIETTE 14 DAY SENSOR) Does not apply Misc 1 each by Does not apply route every 14 (fourteen) days. (Patient not taking: Reported on 3/29/2022) 6 each 0    Tadalafil (CIALIS) 20 MG Oral Tab Take 1 tablet (20 mg total) by mouth daily as needed for Erectile Dysfunction. (Patient not taking: Reported on 3/29/2022) 8 tablet 2    Glucose Blood (ONETOUCH VERIO) In Vitro Strip Check 1-2 times a day. (Patient not taking: Reported on 3/29/2022) 50 each 0    ONETOUCH DELICA LANCETS 33G Does not apply Misc Check once a day (Patient not taking: Reported on 3/29/2022) 100 each 0    Albuterol Sulfate HFA (VENTOLIN) 108 (90 BASE) MCG/ACT Inhalation Aero Soln Inhale 2 puffs into the lungs every 6 (six) hours as needed for Wheezing. (Patient not taking: Reported on 7/15/2020) 2 Inhaler 2     Allergies:  Allergies   Allergen Reactions    Penicillins HIVES       Past Medical History:    Diabetes (HCC)      Past Surgical  History:   Procedure Laterality Date    Electrocardiogram, complete  10/8/2013    scanned to media tab      Family History   Problem Relation Age of Onset    Diabetes Mother       Social History:   Social History     Socioeconomic History    Marital status:    Tobacco Use    Smoking status: Never    Smokeless tobacco: Never    Tobacco comments:     occassional cigar   Vaping Use    Vaping status: Never Used   Substance and Sexual Activity    Alcohol use: Yes     Alcohol/week: 7.0 - 8.0 standard drinks of alcohol     Types: 2 Cans of beer, 5 - 6 Shots of liquor per week    Drug use: No   Other Topics Concern    Caffeine Concern No        Objective:   Vitals:    07/09/24 0947   BP: 160/80   Pulse:      Physical Exam  Constitutional:       General: He is not in acute distress.     Appearance: He is obese. He is not ill-appearing.   HENT:      Head: Normocephalic and atraumatic.      Right Ear: Tympanic membrane normal.      Left Ear: Tympanic membrane normal.      Nose: Nose normal.      Mouth/Throat:      Mouth: Mucous membranes are moist.   Neck:      Thyroid: No thyromegaly.   Cardiovascular:      Rate and Rhythm: Normal rate and regular rhythm.      Heart sounds:      No gallop.   Pulmonary:      Effort: Pulmonary effort is normal.      Breath sounds: Normal breath sounds.   Feet:      Right foot:      Toenail Condition: Right toenails are abnormally thick. Fungal disease present.     Left foot:      Toenail Condition: Left toenails are abnormally thick. Fungal disease present.     Comments: Right barefoot skin diabetic exam is abnormal with diabetic monofilament/sensation testing abnormal and dystrophic nails and/or dry skin.    Bilateral barefoot skin diabetic exam is abnormal with pulsation pedal pulse exam abnormal; bilateral dorsal pedal pulses intact, but diminished when compared to the other peripheral pulses.        Neurological:      Mental Status: He is alert and oriented to person, place, and  time.         Assessment & Plan:   1. Left hand pain  FMLA adjustment made.    2. Type 2 diabetes mellitus without complication, without long-term current use of insulin (HCC)  Compliance emphasized and patient encouraged with his improvement with the drop in his A1c down to 8.9.  - POC Glycohemoglobin [36710]  - Diabetic Retinopathy Exam  OU - Both Eyes; Future  - Comp Metabolic Panel (14)  - Lipid Panel    3. Essential hypertension  Blood pressure measure is not to goal.  Patient encouraged to continue to monitor his dietary intake and exercise 150 minutes/week safely.  Adjustment also made with blood pressure medication.  Amlodipine 5 mg tablet has been added to the antihypertensive regimen for this patient.  - CBC With Differential With Platelet    4. Varicella zoster virus (VZV) vaccination declined  Declined.  - Zoster Recombinant Adjuvanted (Shingrix -Shingles) [46646]    5. Pneumococcal vaccination declined by patient  Declined.      Orders Placed This Encounter   Procedures    POC Glycohemoglobin [65449]    CBC With Differential With Platelet    Comp Metabolic Panel (14)    Lipid Panel    Zoster Recombinant Adjuvanted (Shingrix -Shingles) [[id number]]    Diabetic Retinopathy Exam  OU - Both Eyes       Meds This Visit:  Requested Prescriptions      No prescriptions requested or ordered in this encounter       Imaging & Referrals:  ZOSTER VACC RECOMBINANT IM [facility]     Patient Instructions   FMLA adjustment to be made.  We will call over 3 episodes per 30 days and each episode constitutes 2 days.  This allows for the patient to be off without penalty at least 6 days out of a month and this can be utilized intermittently or consecutively effective immediately on Tuesday, July 9, 2024 through until July 9, 2025.  A1c improved since April 2024.  It has decreased to 8.9 from the 10.1.  We are adding amlodipine 5 mg tablet to the antihypertensive regimen to go along with the hydrochlorothiazide and enalapril for better  blood pressure measures.  Patient encouraged to continue with dietary disciplines and also safe exercises at a rate of 150 minutes/week.  Pneumococcal and shingles vaccines declined.    Return in about 3 months (around 10/9/2024), or if symptoms worsen or fail to improve.

## 2024-07-09 NOTE — PATIENT INSTRUCTIONS
FMLA adjustment to be made.  We will call over 3 episodes per 30 days and each episode constitutes 2 days.  This allows for the patient to be off without penalty at least 6 days out of a month and this can be utilized intermittently or consecutively effective immediately on Tuesday, July 9, 2024 through until July 9, 2025.  A1c improved since April 2024.  It has decreased to 8.9 from the 10.1.  We are adding amlodipine 5 mg tablet to the antihypertensive regimen to go along with the hydrochlorothiazide and enalapril for better blood pressure measures.  Patient encouraged to continue with dietary disciplines and also safe exercises at a rate of 150 minutes/week.  Pneumococcal and shingles vaccines declined.

## 2024-07-10 NOTE — TELEPHONE ENCOUNTER
[facility] FMLA forms via fax. Logged for processing. [employer] message sent for auth.

## 2024-07-22 NOTE — TELEPHONE ENCOUNTER
Type of Leave: Intermittent  Reason for Leave: Left hand pain  Start date of leave: 7/9/24 to 7/9/25  How much time needed?: 3 episodes/mo each lasting 2 days and 1-4 appts/ mo lasting 1-4 hrs.  Forms Due Date:  Was Fee and Turnaround info Given?:

## 2024-07-22 NOTE — TELEPHONE ENCOUNTER
Dr. Allen,     *The ACKNOWLEDGE button has been moved to the top right ribbon*    Please sign off on form if you agree to: Intermittent Family Medical Leave Act from 7/9/24 to 7/9/25, 3 episodes/mo each lasting 2 days and 1-4 appointment/mo each lasting 1-4 hrs due to chronic left hand pain.  (place your signature on the first page only)    -From your Inbasket, Highlight the patient and click Chart   -Double click the 7/9/24 Forms Completion telephone encounter  -Scroll down to the Media section   -Click the blue Hyperlink: Family Medical Leave Act, , 7/22/24    -Click Acknowledge located in the top right ribbon/menu   -Drag the mouse into the blank space of the document and a + sign will appear. Left click to   electronically sign the document.     Thank you,  Catherine COLEMAN

## 2024-07-22 NOTE — TELEPHONE ENCOUNTER
Patient called requesting status of forms completion. Found forms in logged that were rec'd 7/10/24. Expedited forms for patient and will call when completed.

## 2024-07-23 NOTE — TELEPHONE ENCOUNTER
Forms completed and per pts request will  at providers office. Patient was called to inform of completion.

## 2024-08-10 NOTE — TELEPHONE ENCOUNTER
Patient called to request a refill on below medication[facility].         Medication Quantity Refills Start End   Albuterol Sulfate HFA (VENTOLIN) 108 (90 BASE) MCG/ACT Inhalation Aero Soln 2 Inhaler 2 11/18/2014 --   Sig:   Inhale 2 puffs into the lungs every 6 (six) hours as needed for Wheezing.     Patient not taking:   Reported on 7/15/2020     Route:   Inhalation     PRN Reason(s):   Wheezing     Order #:   [id number]

## 2024-08-11 RX ORDER — ALBUTEROL SULFATE 90 UG/1
2 AEROSOL, METERED RESPIRATORY (INHALATION) EVERY 6 HOURS PRN
Qty: 2 EACH | Refills: 2 | Status: SHIPPED | OUTPATIENT
Start: 2024-08-11

## 2024-08-11 NOTE — TELEPHONE ENCOUNTER
Please review; protocol failed/ has no protocol    Requested Prescriptions   Pending Prescriptions Disp Refills    albuterol 108 (90 Base) MCG/ACT Inhalation Aero Soln 2 each 2     Sig: Inhale 2 puffs into the lungs every 6 (six) hours as needed for Wheezing.       Asthma & COPD Medication Protocol Failed - 8/11/2024  7:39 AM        Failed - Asthma Action Score greater than or equal to 20        Failed - AAP/ACT given in last 12 months     No data recorded  No data recorded  No data recorded  No data recorded          Passed - Appointment in past 6 or next 3 months      Recent Outpatient Visits              1 month ago Left hand pain    [facility], [facility] Derik Allen, DO    Office Visit    4 months ago Type 2 diabetes mellitus without complication, without long-term current use of insulin ([facility]    [facility]    11 months ago Pre-op evaluation    [facility] Derik Allen, DO    Office Visit    1 year ago Routine physical examination    [facility] [place]    Office Visit    1 year ago Type 2 diabetes mellitus without complication, without long-term current use of insulin ([facility])    [facility] Derik Allen, DO    Office Visit                         Recent Outpatient Visits              1 month ago Left hand pain    [facility] [place]    Office Visit    4 months ago Type 2 diabetes mellitus without complication, without long-term current use of insulin ([facility])    [facility] [place]    Office Visit    11 months ago [facility] evaluation    [facility] [place]    [facility] Visit    1 year  ago Routine physical examination    [facility] [place]    [place] Visit    1 year ago Type 2 diabetes mellitus without complication, without long-term current use of insulin (HCC)    [facility]    [facility]

## 2024-09-11 RX ORDER — ENALAPRIL MALEATE 10 MG/1
10 TABLET ORAL DAILY
Qty: 90 TABLET | Refills: 3 | Status: SHIPPED | OUTPATIENT
Start: 2024-09-11

## 2024-09-11 NOTE — TELEPHONE ENCOUNTER
Please Review. Protocol Failed; No Protocol   BP Readings from Last 1 Encounters:   07/09/24 160/80     Requested Prescriptions   Pending Prescriptions Disp Refills    ENALAPRIL 10 MG Oral Tab [Pharmacy Med Name: ENALAPRIL MALEATE 10 MG TAB] 90 tablet 1     Sig: TAKE 1 TABLET BY MOUTH EVERY DAY       Hypertension Medications Protocol Failed - 9/11/2024  1:57 PM        Failed - CMP or BMP in past 12 months        Failed - Last BP reading less than 140/90     BP Readings from Last 1 Encounters:   07/09/24 160/80               Failed - EGFRCR or GFRAA > 50     GFR Evaluation            Passed - In person appointment or virtual visit in the past 12 mos or appointment in next 3 mos     Recent Outpatient Visits              2 months ago Left hand pain    [facility]    Office Visit    5 months ago Type 2 diabetes mellitus without complication, without long-term current use of insulin ([facility])    [facility]    Office Visit    1 year ago Pre-op evaluation    [facility] [place]    Office Visit    1 year ago Routine physical examination    [facility]    Office Visit    1 year ago Type 2 diabetes mellitus without complication, without long-term current use of insulin ([facility])    [facility]    Office Visit                               Recent Outpatient Visits              2 months ago Left hand pain    [facility]    Office Visit    5 months ago Type 2 diabetes mellitus without complication, without long-term current use of insulin ([facility])    [facility]    Office Visit    1 year ago Pre-op  evaluation    [facility]    Office Visit    1 year ago Routine physical examination    [facility] [place] Zhang Forrest J, DO    Office Visit    1 year ago Type 2 diabetes mellitus without complication, without long-term current use of insulin (HCC)    [facility] [place] Zbigniew Zhang Forrest J, DO    Office Visit

## 2024-10-09 NOTE — TELEPHONE ENCOUNTER
Patient dropped off [place] paperwork with Dr. Allen today, also emailed a copy to the forms dept and placed originals in [facility] forms box. Please call patient with any questions.    Adequate

## 2024-12-03 DIAGNOSIS — E11.9 TYPE 2 DIABETES MELLITUS WITHOUT COMPLICATION, WITHOUT LONG-TERM CURRENT USE OF INSULIN (HCC): ICD-10-CM

## 2024-12-05 NOTE — TELEPHONE ENCOUNTER
Please review. Protocol Failed; No Protocol    Requested Prescriptions   Pending Prescriptions Disp Refills    METFORMIN 500 MG Oral Tab [Pharmacy Med Name: METFORMIN  MG TABLET] 180 tablet 3     Sig: TAKE 1 TABLET BY MOUTH TWICE A DAY WITH MEALS       Diabetes Medication Protocol Failed - 12/5/2024  3:25 PM        Failed - Last A1C < 7.5 and within past 6 months     Lab Results   Component Value Date    A1C 8.4 (A) 07/09/2024             Failed - EGFRCR or GFRAA > 50     GFR Evaluation            Failed - GFR in the past 12 months        Passed - In person appointment or virtual visit in the past 6 mos or appointment in next 3 mos     Recent Outpatient Visits              4 months ago Left hand pain    [facility] [facility]    Office Visit    8 months ago Type 2 diabetes mellitus without complication, without long-term current use of insulin ([facility])    [facility]     Office Visit    1 year ago Pre-op evaluation    [facility],     Office Visit    1 year ago Routine physical examination    [facility]    Office Visit    2 years ago Type 2 diabetes mellitus without complication, without long-term current use of insulin ([place])    [facility],     Office Visit                      Passed - Microalbumin procedure in past 12 months or taking ACE/ARB                 Recent Outpatient Visits              4 months ago Left hand pain    [facility],     Office Visit    8 months ago Type 2 diabetes mellitus without complication, without long-term current use of insulin ([place])    [facility] [place]     Office Visit    1 year ago Pre-op evaluation    [facility] [place] Zbigniew Zhang Forrest J, DO    Office Visit    1 year ago Routine physical examination    [facility], [address], [place], Derik J,     Office Visit    2 years ago Type 2 diabetes mellitus without complication, without long-term current use of insulin (HCC)    [facility], [address], [place], Derik J, DO    Office Visit

## 2025-03-05 NOTE — TELEPHONE ENCOUNTER
Patient called to request a refill on below medication.    [facility] on file verified.    Patient declined to contact the pharmacy.       Medication Quantity Refills Start End   amLODIPine 5 MG Oral Tab 90 tablet 1 7/9/2024 --   Sig:   Take 1 tablet (5 mg total) by mouth daily.     Route:   Oral     Order #:   [id number]

## 2025-03-10 RX ORDER — AMLODIPINE BESYLATE 5 MG/1
5 TABLET ORAL DAILY
Qty: 90 TABLET | Refills: 3 | Status: SHIPPED | OUTPATIENT
Start: 2025-03-10

## 2025-07-30 ENCOUNTER — TELEPHONE (OUTPATIENT)
Dept: FAMILY MEDICINE CLINIC | Facility: CLINIC | Age: 60
End: 2025-07-30

## 2025-07-30 DIAGNOSIS — E11.9 TYPE 2 DIABETES MELLITUS WITHOUT COMPLICATION, WITHOUT LONG-TERM CURRENT USE OF INSULIN (HCC): ICD-10-CM

## 2025-07-30 DIAGNOSIS — E11.9 TYPE 2 DIABETES MELLITUS WITHOUT COMPLICATION, WITHOUT LONG-TERM CURRENT USE OF INSULIN (HCC): Primary | ICD-10-CM

## 2025-07-30 DIAGNOSIS — I10 ESSENTIAL HYPERTENSION: ICD-10-CM

## 2025-07-30 RX ORDER — ENALAPRIL MALEATE 10 MG/1
10 TABLET ORAL DAILY
Qty: 90 TABLET | Refills: 3 | Status: SHIPPED | OUTPATIENT
Start: 2025-07-30

## (undated) DIAGNOSIS — E78.5 HYPERLIPIDEMIA, UNSPECIFIED HYPERLIPIDEMIA TYPE: Primary | ICD-10-CM

## (undated) NOTE — LETTER
3/6/2019              [place] [address] [address]         Dear Suzanne Whitlock,    This letter is to inform you that our office has made several attempts to reach you by phone without success.   We were attempting to contact y

## (undated) NOTE — LETTER
5/28/2019              [place] [address] [address]         Dear Danna Guo,    It has come to our attention that you are due for your 2 month follow-up appointment with Dr. Tiara Montelongo.   As you know regular medic

## (undated) NOTE — MR AVS SNAPSHOT
[facility]  [address], [address]  [phone]               Thank you for choosing us for your health care visit with King Janie MD.  We are glad to serve you and happy to provide you with this summary Glucose Blood Strp   Check 1-2 times a day.    Commonly known as:  ONETOUCH VERIO           glyBURIDE 2.5 MG Tabs   Take 1 tablet (2.5 mg total) by mouth daily with breakfast.   Commonly known as:  DIABETA           MetFORMIN HCl 500 MG Tabs   Take 1 ta

## (undated) NOTE — LETTER
7/16/2018              Jamison Amaya        [address] [place] [id number]         Dear Candance Cos,    This letter is to inform you that our office has made several attempts to reach you by phone without success.   We were attempting to contact

## (undated) NOTE — LETTER
1/18/2018          To Whom It May Concern:    Elpidio Roberts is currently under my medical care and may not return to work at this time. Please excuse Rosie Garcia for 3 days beginning 01/15/18. He may return to work on 01/22/18.   Activity is restricted as fo

## (undated) NOTE — LETTER
Date & Time: 9/7/2021, 12:52 PM  Patient: Zigmund Bloch  Encounter Provider(s):    PHILIP Zaragoza       To Whom It May Concern:    Zigmund Bloch was seen and treated in our department on 9/7/2021.  He should not return to work until 09/09/2021 due

## (undated) NOTE — Clinical Note
Thank you for the consult. I saw Ms. Alexis Rubalcava in the endocrine/diabetes clinic today. Please see attached my note. Please feel free to contact me with any questions. Thanks!

## (undated) NOTE — MR AVS SNAPSHOT
[facility]  [address], [address]  [phone]               Thank you for choosing us for your health care visit with Peewee Chen DO.   We are glad to serve you and happy to provide you with this sum Medication reviewed and renewed where needed and appropriate. Comply with medications.          Allergies as of May 12, 2017     Penicillins Hives                Today's Vital Signs     BP Pulse Temp Height Weight BMI    120/80 mmHg 69 97.4 °F (36.3 °C) (O

## (undated) NOTE — LETTER
3/9/2018          To Whom It May Concern:    Peng Nam is currently under my medical care and may not return to work at this time due to diagnosis of pneumonia. Please excuse Leonel Mcmillan for 11 days, starting 2/28/18. He may return to work on 3/12/8.   A

## (undated) NOTE — LETTER
Date & Time: 9/9/2021, 11:25 AM  Patient: Dyana Knife  Encounter Provider(s):    PHILIP Skelton       To Whom It May Concern:    Dyana Knife was seen and treated in our department on 9/7/2021.  He should not return to work until 09/13/2021 due

## (undated) NOTE — Clinical Note
Thank you for the consult. I saw Mr. Bautista Mcfarlane in  the endocrine/diabetes clinic today. Please see attached my note. Please feel free to contact me with any questions. Thanks!

## (undated) NOTE — LETTER
6/21/2019          To Whom It May Concern:    Jimena Goodwin is currently under my medical care and may not return to work at this time. Please excuse Leti Monae for 8 days. 6/10/19-6/20/2019  He may return to work on 6/24/2019.   Activity is restricted as fo

## (undated) NOTE — LETTER
6/18/2019          To Whom It May Concern:    Erma Garzon is currently under my medical care and was seen in clinic for an acute visit. He is cleared to return to work on 6/20/19 with no restrictions.      If you require additional information please con

## (undated) NOTE — MR AVS SNAPSHOT
[facility]  [address]  [phone]               Thank you for choosing us for your health care visit with Iris Funez MD.  We are glad to serve you and happy to provide you with this summary o MetFORMIN HCl 500 MG Tabs   Take 1 tablet (500 mg total) by mouth 2 (two) times daily with meals. Commonly known as:  GLUCOPHAGE           Tadalafil 20 MG Tabs   Take 1 tablet (20 mg total) by mouth daily as needed for Erectile Dysfunction.    Commonly k

## (undated) NOTE — LETTER
3/14/2019          To Whom It May Concern:    Tremaine Adkins is currently under my medical care and may not return to work at this time. He may return to work on 3/18/19.   Activity is restricted as follows: none    If you require additional information

## (undated) NOTE — LETTER
7/30/2018              [place] [address] [address]         Dear Suzanne Whitlock,    This letter is to inform you that our office has made several attempts to reach you by phone without success.   We were attempting to contact